# Patient Record
Sex: FEMALE | NOT HISPANIC OR LATINO | Employment: FULL TIME | URBAN - METROPOLITAN AREA
[De-identification: names, ages, dates, MRNs, and addresses within clinical notes are randomized per-mention and may not be internally consistent; named-entity substitution may affect disease eponyms.]

---

## 2017-07-19 ENCOUNTER — APPOINTMENT (OUTPATIENT)
Dept: CT IMAGING | Facility: CLINIC | Age: 42
End: 2017-07-19
Attending: STUDENT IN AN ORGANIZED HEALTH CARE EDUCATION/TRAINING PROGRAM
Payer: COMMERCIAL

## 2017-07-19 ENCOUNTER — HOSPITAL ENCOUNTER (EMERGENCY)
Facility: CLINIC | Age: 42
Discharge: HOME | End: 2017-07-19
Attending: STUDENT IN AN ORGANIZED HEALTH CARE EDUCATION/TRAINING PROGRAM | Admitting: STUDENT IN AN ORGANIZED HEALTH CARE EDUCATION/TRAINING PROGRAM
Payer: COMMERCIAL

## 2017-07-19 ENCOUNTER — APPOINTMENT (OUTPATIENT)
Dept: ULTRASOUND IMAGING | Facility: CLINIC | Age: 42
End: 2017-07-19
Attending: STUDENT IN AN ORGANIZED HEALTH CARE EDUCATION/TRAINING PROGRAM
Payer: COMMERCIAL

## 2017-07-19 VITALS
WEIGHT: 200 LBS | HEIGHT: 72 IN | OXYGEN SATURATION: 97 % | SYSTOLIC BLOOD PRESSURE: 138 MMHG | BODY MASS INDEX: 27.09 KG/M2 | HEART RATE: 82 BPM | TEMPERATURE: 98 F | RESPIRATION RATE: 16 BRPM | DIASTOLIC BLOOD PRESSURE: 94 MMHG

## 2017-07-19 DIAGNOSIS — N20.1 LEFT URETERAL CALCULUS: ICD-10-CM

## 2017-07-19 DIAGNOSIS — K76.0 FATTY LIVER: ICD-10-CM

## 2017-07-19 LAB
ALBUMIN SERPL-MCNC: 4.3 G/DL (ref 3.4–5)
ALBUMIN UR-MCNC: NEGATIVE MG/DL
ALP SERPL-CCNC: 105 U/L (ref 40–150)
ALT SERPL W P-5'-P-CCNC: 309 U/L (ref 0–50)
ANION GAP SERPL CALCULATED.3IONS-SCNC: 8 MMOL/L (ref 3–14)
APPEARANCE UR: CLEAR
AST SERPL W P-5'-P-CCNC: 256 U/L (ref 0–45)
BASOPHILS # BLD AUTO: 0 10E9/L (ref 0–0.2)
BASOPHILS NFR BLD AUTO: 0.3 %
BILIRUB SERPL-MCNC: 0.6 MG/DL (ref 0.2–1.3)
BILIRUB UR QL STRIP: NEGATIVE
BUN SERPL-MCNC: 8 MG/DL (ref 7–30)
CALCIUM SERPL-MCNC: 9.9 MG/DL (ref 8.5–10.1)
CHLORIDE SERPL-SCNC: 107 MMOL/L (ref 94–109)
CO2 SERPL-SCNC: 25 MMOL/L (ref 20–32)
COLOR UR AUTO: YELLOW
CREAT SERPL-MCNC: 0.7 MG/DL (ref 0.52–1.04)
DIFFERENTIAL METHOD BLD: ABNORMAL
EOSINOPHIL # BLD AUTO: 0.1 10E9/L (ref 0–0.7)
EOSINOPHIL NFR BLD AUTO: 0.7 %
ERYTHROCYTE [DISTWIDTH] IN BLOOD BY AUTOMATED COUNT: 12.3 % (ref 10–15)
GFR SERPL CREATININE-BSD FRML MDRD: ABNORMAL ML/MIN/1.7M2
GLUCOSE SERPL-MCNC: 124 MG/DL (ref 70–99)
GLUCOSE UR STRIP-MCNC: NEGATIVE MG/DL
HCG UR QL: NEGATIVE
HCT VFR BLD AUTO: 42.8 % (ref 35–47)
HGB BLD-MCNC: 14.8 G/DL (ref 11.7–15.7)
HGB UR QL STRIP: ABNORMAL
IMM GRANULOCYTES # BLD: 0 10E9/L (ref 0–0.4)
IMM GRANULOCYTES NFR BLD: 0.1 %
KETONES UR STRIP-MCNC: 10 MG/DL
LEUKOCYTE ESTERASE UR QL STRIP: NEGATIVE
LIPASE SERPL-CCNC: 190 U/L (ref 73–393)
LYMPHOCYTES # BLD AUTO: 0.8 10E9/L (ref 0.8–5.3)
LYMPHOCYTES NFR BLD AUTO: 8.6 %
MCH RBC QN AUTO: 34.3 PG (ref 26.5–33)
MCHC RBC AUTO-ENTMCNC: 34.6 G/DL (ref 31.5–36.5)
MCV RBC AUTO: 99 FL (ref 78–100)
MONOCYTES # BLD AUTO: 0.6 10E9/L (ref 0–1.3)
MONOCYTES NFR BLD AUTO: 5.7 %
MUCOUS THREADS #/AREA URNS LPF: PRESENT /LPF
NEUTROPHILS # BLD AUTO: 8.1 10E9/L (ref 1.6–8.3)
NEUTROPHILS NFR BLD AUTO: 84.6 %
NITRATE UR QL: NEGATIVE
PH UR STRIP: 6 PH (ref 5–7)
PLATELET # BLD AUTO: 212 10E9/L (ref 150–450)
POTASSIUM SERPL-SCNC: 3.6 MMOL/L (ref 3.4–5.3)
PROT SERPL-MCNC: 8.2 G/DL (ref 6.8–8.8)
RBC # BLD AUTO: 4.31 10E12/L (ref 3.8–5.2)
RBC #/AREA URNS AUTO: 15 /HPF (ref 0–2)
SODIUM SERPL-SCNC: 140 MMOL/L (ref 133–144)
SP GR UR STRIP: 1.01 (ref 1–1.03)
SQUAMOUS #/AREA URNS AUTO: 2 /HPF (ref 0–1)
URN SPEC COLLECT METH UR: ABNORMAL
UROBILINOGEN UR STRIP-MCNC: NORMAL MG/DL (ref 0–2)
WBC # BLD AUTO: 9.6 10E9/L (ref 4–11)
WBC #/AREA URNS AUTO: 2 /HPF (ref 0–2)

## 2017-07-19 PROCEDURE — 80053 COMPREHEN METABOLIC PANEL: CPT | Performed by: STUDENT IN AN ORGANIZED HEALTH CARE EDUCATION/TRAINING PROGRAM

## 2017-07-19 PROCEDURE — 81025 URINE PREGNANCY TEST: CPT | Performed by: STUDENT IN AN ORGANIZED HEALTH CARE EDUCATION/TRAINING PROGRAM

## 2017-07-19 PROCEDURE — 85025 COMPLETE CBC W/AUTO DIFF WBC: CPT | Performed by: STUDENT IN AN ORGANIZED HEALTH CARE EDUCATION/TRAINING PROGRAM

## 2017-07-19 PROCEDURE — 25000125 ZZHC RX 250: Performed by: RADIOLOGY

## 2017-07-19 PROCEDURE — 83690 ASSAY OF LIPASE: CPT | Performed by: STUDENT IN AN ORGANIZED HEALTH CARE EDUCATION/TRAINING PROGRAM

## 2017-07-19 PROCEDURE — 96361 HYDRATE IV INFUSION ADD-ON: CPT

## 2017-07-19 PROCEDURE — 99284 EMERGENCY DEPT VISIT MOD MDM: CPT | Mod: 25

## 2017-07-19 PROCEDURE — 96374 THER/PROPH/DIAG INJ IV PUSH: CPT | Mod: 59

## 2017-07-19 PROCEDURE — 25000128 H RX IP 250 OP 636: Performed by: STUDENT IN AN ORGANIZED HEALTH CARE EDUCATION/TRAINING PROGRAM

## 2017-07-19 PROCEDURE — 74177 CT ABD & PELVIS W/CONTRAST: CPT

## 2017-07-19 PROCEDURE — 76705 ECHO EXAM OF ABDOMEN: CPT

## 2017-07-19 PROCEDURE — 99283 EMERGENCY DEPT VISIT LOW MDM: CPT | Performed by: STUDENT IN AN ORGANIZED HEALTH CARE EDUCATION/TRAINING PROGRAM

## 2017-07-19 PROCEDURE — 25000128 H RX IP 250 OP 636: Performed by: RADIOLOGY

## 2017-07-19 PROCEDURE — 81001 URINALYSIS AUTO W/SCOPE: CPT | Performed by: STUDENT IN AN ORGANIZED HEALTH CARE EDUCATION/TRAINING PROGRAM

## 2017-07-19 RX ORDER — IOPAMIDOL 755 MG/ML
97 INJECTION, SOLUTION INTRAVASCULAR ONCE
Status: COMPLETED | OUTPATIENT
Start: 2017-07-19 | End: 2017-07-19

## 2017-07-19 RX ORDER — KETOROLAC TROMETHAMINE 15 MG/ML
15 INJECTION, SOLUTION INTRAMUSCULAR; INTRAVENOUS ONCE
Status: COMPLETED | OUTPATIENT
Start: 2017-07-19 | End: 2017-07-19

## 2017-07-19 RX ORDER — HYDROCODONE BITARTRATE AND ACETAMINOPHEN 5; 325 MG/1; MG/1
1-2 TABLET ORAL EVERY 4 HOURS PRN
Qty: 10 TABLET | Refills: 0 | Status: SHIPPED | OUTPATIENT
Start: 2017-07-19 | End: 2019-01-15

## 2017-07-19 RX ADMIN — SODIUM CHLORIDE 64 ML: 9 INJECTION, SOLUTION INTRAVENOUS at 15:52

## 2017-07-19 RX ADMIN — KETOROLAC TROMETHAMINE 15 MG: 15 INJECTION, SOLUTION INTRAMUSCULAR; INTRAVENOUS at 14:20

## 2017-07-19 RX ADMIN — SODIUM CHLORIDE 1000 ML: 9 INJECTION, SOLUTION INTRAVENOUS at 14:00

## 2017-07-19 RX ADMIN — IOPAMIDOL 97 ML: 755 INJECTION, SOLUTION INTRAVENOUS at 15:52

## 2017-07-19 NOTE — ED NOTES
"Pt here with abdominal pain and flank pain, has a family history of stones, \"this feels worse then labor pains\"  "

## 2017-07-19 NOTE — ED PROVIDER NOTES
History     Chief Complaint   Patient presents with     Flank Pain     left side  N/V     HPI  Geovanna Olvera is a 41 year old female who presents for evaluation of left-sided flank pain with nausea and vomiting. Patient claims that she was feeling well yesterday, typical diet and without gastrointestinal symptoms, however he woke this morning with achy left-sided flank pain. The pain is associated with nausea and the patient has had several episodes of nonbloody emesis. She denies chest pain, cough, midline back pain, dysuria, hematuria, or recent injury. She is currently menstruating.     I have reviewed the Medications, Allergies, Past Medical and Surgical History, and Social History in the Epic system.    Patient Active Problem List   Diagnosis     Appendicitis, acute       Past Surgical History:   Procedure Laterality Date     LAPAROSCOPIC APPENDECTOMY  8/15/2011    Procedure:LAPAROSCOPIC APPENDECTOMY; Surgeon:QIANA PERKINS; Location:[facility]       Social History     Social History     Marital status: Single     Spouse name: N/A     Number of children: N/A     Years of education: N/A     Occupational History     Not on file.     Social History Main Topics     Smoking status: Current Every Day Smoker     Packs/day: 0.50     Years: 10.00     Types: Cigarettes     Smokeless tobacco: Not on file     Alcohol use Yes     Drug use: No     Sexual activity: Not on file     Other Topics Concern     Not on file     Social History Narrative       No family history on file.      There is no immunization history on file for this patient.      Review of Systems  Constitutional: Negative for fever or chills.  Respiratory: Negative for cough or shortness of breath.  Cardiovascular: Negative for chest pain.  Gastrointestinal: Positive for left flank pain with nausea and vomiting. Negative for anterior abdominal pain, diarrhea, or blood with bowel movements.  Genitourinary: Negative for dysuria, hematuria, pelvic pain or  vaginal discharge.  Musculoskeletal: Negative for midline back pain or recent injuries.  Neurological: Negative for headache.    All others reviewed and are negative.      Physical Exam   BP: (!) 154/95  Pulse: 91  Temp: 98  F (36.7  C)  Resp: 16  Height: 182.9 cm (6')  Weight: 90.7 kg (200 lb)  SpO2: 100 %  Physical Exam  Constitutional: Well developed, well nourished. Appears nontoxic and in no acute distress.   Head: Normocephalic and atraumatic. Symmetric in appearance.  Eyes: Conjunctivae are normal.  Neck: Neck supple.  Cardiovascular: No cyanosis. RRR. No audible murmurs noted.  Respiratory: Effort normal, no respiratory distress. CTAB without diminished regions. No wheezing, rhonchi, or crackles.  Gastrointestinal: Soft, nondistended abdomen. Left flank tenderness, anterior abdomen is nontender and without guarding. No rigidity or rebound tenderness. Negative McBurney's point. Negative for Mcnally's sign.   Musculoskeletal: Moves all extremities spontaneously and without complaint.  Neuro: Patient is alert.  Skin: Skin is warm and dry, not diaphoretic.      ED Course     ED Course     Procedures            Critical Care time:  none               Results for orders placed or performed during the hospital encounter of 07/19/17 (from the past 24 hour(s))   CBC with platelets differential   Result Value Ref Range    WBC 9.6 4.0 - 11.0 10e9/L    RBC Count 4.31 3.8 - 5.2 10e12/L    Hemoglobin 14.8 11.7 - 15.7 g/dL    Hematocrit 42.8 35.0 - 47.0 %    MCV 99 78 - 100 fl    MCH 34.3 (H) 26.5 - 33.0 pg    MCHC 34.6 31.5 - 36.5 g/dL    RDW 12.3 10.0 - 15.0 %    Platelet Count 212 150 - 450 10e9/L    Diff Method Automated Method     % Neutrophils 84.6 %    % Lymphocytes 8.6 %    % Monocytes 5.7 %    % Eosinophils 0.7 %    % Basophils 0.3 %    % Immature Granulocytes 0.1 %    Absolute Neutrophil 8.1 1.6 - 8.3 10e9/L    Absolute Lymphocytes 0.8 0.8 - 5.3 10e9/L    Absolute Monocytes 0.6 0.0 - 1.3 10e9/L    Absolute  Eosinophils 0.1 0.0 - 0.7 10e9/L    Absolute Basophils 0.0 0.0 - 0.2 10e9/L    Abs Immature Granulocytes 0.0 0 - 0.4 10e9/L   Comprehensive metabolic panel   Result Value Ref Range    Sodium 140 133 - 144 mmol/L    Potassium 3.6 3.4 - 5.3 mmol/L    Chloride 107 94 - 109 mmol/L    Carbon Dioxide 25 20 - 32 mmol/L    Anion Gap 8 3 - 14 mmol/L    Glucose 124 (H) 70 - 99 mg/dL    Urea Nitrogen 8 7 - 30 mg/dL    Creatinine 0.70 0.52 - 1.04 mg/dL    GFR Estimate >90  Non  GFR Calc   >60 mL/min/1.7m2    GFR Estimate If Black >90   GFR Calc   >60 mL/min/1.7m2    Calcium 9.9 8.5 - 10.1 mg/dL    Bilirubin Total 0.6 0.2 - 1.3 mg/dL    Albumin 4.3 3.4 - 5.0 g/dL    Protein Total 8.2 6.8 - 8.8 g/dL    Alkaline Phosphatase 105 40 - 150 U/L     (H) 0 - 50 U/L     (H) 0 - 45 U/L   Lipase   Result Value Ref Range    Lipase 190 73 - 393 U/L   HCG qualitative urine   Result Value Ref Range    HCG Qual Urine Negative NEG   UA reflex to Microscopic   Result Value Ref Range    Color Urine Yellow     Appearance Urine Clear     Glucose Urine Negative NEG mg/dL    Bilirubin Urine Negative NEG    Ketones Urine 10 (A) NEG mg/dL    Specific Gravity Urine 1.007 1.003 - 1.035    Blood Urine Moderate (A) NEG    pH Urine 6.0 5.0 - 7.0 pH    Protein Albumin Urine Negative NEG mg/dL    Urobilinogen mg/dL Normal 0.0 - 2.0 mg/dL    Nitrite Urine Negative NEG    Leukocyte Esterase Urine Negative NEG    Source Midstream Urine     RBC Urine 15 (H) 0 - 2 /HPF    WBC Urine 2 0 - 2 /HPF    Squamous Epithelial /HPF Urine 2 (H) 0 - 1 /HPF    Mucous Urine Present (A) NEG /LPF   CT Abdomen Pelvis w Contrast    Narrative    CT ABDOMEN PELVIS W CONTRAST 7/19/2017 4:02 PM    HISTORY: Left flank pain.    CONTRAST:  97 mL Isovue 370.    TECHNIQUE: CT of the abdomen and pelvis is performed with IV contrast.    Routine assessed structures include the liver, spleen, pancreas,  adrenal glands, and kidneys. Other assessed  structures include the  retroperitoneum, abdominal aorta, visualized gastrointestinal tract,  and abdominal wall. Intrapelvic anatomy is also assessed.    Radiation dose for this scan is reduced using automated exposure  control, adjustment of the mA and/or kV according to patient size, or  iterative reconstruction technique.    COMPARISON: 8/15/2011.    FINDINGS:    Abdomen: There is a 2-3 mm nonobstructing calculus in the right  kidney. This was not apparent on the prior study. There is mild  left-sided hydronephrosis. There is a small amount of perirenal  inflammatory change and minimal fluid in the left perirenal space.  Fatty infiltration of the liver is present.    Pelvis:  There is a 2 mm calculus at the left ureterovesical junction.  There is a 2 cm cyst in the right ovary. There is no free pelvic  fluid.      Impression    IMPRESSION:    1. There is a 2 mm calculus at the left ureterovesical junction with  mild associated hydronephrosis.  2. There is a 2-3 mm nonobstructing calculus in the right kidney.    CAROL TRUONG MD   Abdomen US, limited (RUQ only)    Narrative    ULTRASOUND ABDOMEN LIMITED 7/19/2017 5:02 PM     HISTORY: Left flank pain, elevated hepatic enzymes.    TECHNIQUE: Right upper quadrant ultrasound.    FINDINGS: The gallbladder is normal in appearance. There is no  evidence for cholelithiasis or biliary obstruction. Common duct  measures 0.4 cm. There is some diffuse fatty infiltration of the  liver. The liver is otherwise normal in appearance. The pancreas and  right kidney are normal.      Impression    IMPRESSION:  1. No evidence for cholelithiasis or biliary obstruction.  2. Diffuse fatty infiltration of the liver.    LEBRON HECTOR MD         Assessments & Plan (with Medical Decision Making)   Geovanna Olvera is a 41 year old female who presents to the department for complaint of left-sided flank pain with nausea and vomiting. She does have moderate blood up on urinalysis but currently  menstruating, nitrate negative and unimpressive for infection but culture pending. CBC is without leukocytosis. Lipase within reference range. CT imaging has confirmed left-sided ureteral calculus at the UVJ as well as incidental 2 cm cyst of right ovary. However she has moderately elevated hepatic enzymes without right upper quadrant tenderness. Formal ultrasound was ordered, no sign of cholecystitis or choledocholithiasis but diffuse fatty liver. This could be the cause of elevated hepatic enzymes. Recommend she take OTC ibuprofen as needed for left flank pain, will also be provided a short course of hydrocodone/acetaminophen as needed for breakthrough pain. Comprehensive metabolic panel ordered as outpatient to complete completed over the next 2-3 days.    Prior to discharge, I made it clear that illness can unexpectedly develop/progress so she has been instructed to return to the emergency department for reevaluation of evolving symptoms, change in severity, fever, or other concerns. She seems comfortable with the discharge plan we discussed including follow up with urology if flank pain does not resolve. Must also follow up with primary care to reevaluate her elevated hepatic enzymes.    Disclaimer: This note consists of symbols derived from keyboarding, dictation, and/or voice recognition software. As a result, there may be errors in the script that have gone undetected.  Please consider this when interpreting information found in the chart.        I have reviewed the nursing notes.    I have reviewed the findings, diagnosis, plan and need for follow up with the patient.       New Prescriptions    HYDROCODONE-ACETAMINOPHEN (NORCO) 5-325 MG PER TABLET    Take 1-2 tablets by mouth every 4 hours as needed for moderate to severe pain       Final diagnoses:   Left ureteral calculus   Fatty liver       7/19/2017   [facility] EMERGENCY DEPARTMENT     Will Lawrence DO  07/19/17 0816

## 2017-07-19 NOTE — DISCHARGE INSTRUCTIONS
Treating Kidney Stones: Expectant Therapy  Most kidney stones are about the size of a grape seed. Stones of this size are small enough to pass naturally. Once it is passed, a stone can be analyzed. This wait-and-see approach is called expectant therapy. Small stones can often be passed with expectant therapy. If pain is a problem, ask your healthcare provider about pain medicines. Then follow his or her directions on how much water to drink. Drinking more water creates more urine to flush out your stone. Also be sure to strain your urine. Take any stones you pass to your provider for analysis.    Drink lots of water  Drinking lots of water may help your stone pass. Water also dilutes the chemicals in your urine. This reduces your risk of forming new stones. You may be told to drink 8, 12-ounce glasses of water a day. Avoid liquids that dehydrate you, such as those containing caffeine or alcohol.  Strain your urine  Straining your urine lets you collect your stone for analysis. Use the strainer each time you urinate. Strain your urine for as long as your healthcare provider suggests. Watch for brown, tan, gold, or black specks or tiny dorinda. These may be kidney stones.  Take your medicine  Your healthcare provider may give you medicine that makes it more likely for you to pass the kidney stone.   Follow up with your healthcare provider  Follow up by taking any stones you find to your provider for analysis. The type of stone you have determines your diet and prevention program. You may need more tests in the future. These tests will ensure that new stones are not forming.  Date Last Reviewed: 1/1/2017 2000-2017 [employer]. [address] [place] [address], [place] [address]. All rights reserved. This information is not intended as a substitute for professional medical care. Always follow your healthcare professional's instructions.

## 2017-07-19 NOTE — ED AVS SNAPSHOT
[facility] Emergency Department    [address] [address]    Phone:  [phone]    Fax:  [phone]                                       Geovanna Olvera   MRN: [id number]    Department:  [facility] Emergency Department   Date of Visit:  7/19/2017           After Visit Summary Signature Page     I have received my discharge instructions, and my questions have been answered. I have discussed any challenges I see with this plan with the nurse or doctor.    ..........................................................................................................................................  Patient/Patient Representative Signature      ..........................................................................................................................................  Patient Representative Print Name and Relationship to Patient    ..................................................               ................................................  Date                                            Time    ..........................................................................................................................................  Reviewed by Signature/Title    ...................................................              ..............................................  Date                                                            Time

## 2017-07-19 NOTE — ED AVS SNAPSHOT
[facility] Emergency Department    [address] [address]    Phone:  [phone]    Fax:  [phone]                                       Geovanna Olvera   MRN: [id number]    Department:  [facility] Emergency Department   Date of Visit:  7/19/2017           Patient Information     Date Of Birth          1975        Your diagnoses for this visit were:     Left ureteral calculus     Fatty liver        You were seen by Will Lawrence DO.      Follow-up Information     Follow up with [facility]. Schedule an appointment as soon as possible for a visit in 5 days.    Specialty:  Clinic    Why:  Followup for reevaluation and managment plan, discussed liver enzymes.        Discharge Instructions         Treating Kidney Stones: Expectant Therapy  Most kidney stones are about the size of a grape seed. Stones of this size are small enough to pass naturally. Once it is passed, a stone can be analyzed. This wait-and-see approach is called expectant therapy. Small stones can often be passed with expectant therapy. If pain is a problem, ask your healthcare provider about pain medicines. Then follow his or her directions on how much water to drink. Drinking more water creates more urine to flush out your stone. Also be sure to strain your urine. Take any stones you pass to your provider for analysis.    Drink lots of water  Drinking lots of water may help your stone pass. Water also dilutes the chemicals in your urine. This reduces your risk of forming new stones. You may be told to drink 8, 12-ounce glasses of water a day. Avoid liquids that dehydrate you, such as those containing caffeine or alcohol.  Strain your urine  Straining your urine lets you collect your stone for analysis. Use the strainer each time you urinate. Strain your urine for as long as your healthcare provider suggests. Watch for brown, tan, gold, or black specks or tiny dorinda. These may be kidney  stones.  Take your medicine  Your healthcare provider may give you medicine that makes it more likely for you to pass the kidney stone.   Follow up with your healthcare provider  Follow up by taking any stones you find to your provider for analysis. The type of stone you have determines your diet and prevention program. You may need more tests in the future. These tests will ensure that new stones are not forming.  Date Last Reviewed: 1/1/2017 2000-2017 [employer]. [address] [place] [address], [place] [address]. All rights reserved. This information is not intended as a substitute for professional medical care. Always follow your healthcare professional's instructions.          24 Hour Appointment Hotline       To make an appointment at any [facility], call [phone] ([phone]). If you don't have a family doctor or clinic, we will help you find one. [facility] clinics are conveniently located to serve the needs of you and your family.          ED Discharge Orders     Comprehensive metabolic panel                    Review of your medicines      START taking        Dose / Directions Last dose taken    HYDROcodone-acetaminophen 5-325 MG per tablet   Commonly known as:  NORCO   Dose:  1-2 tablet   Quantity:  10 tablet        Take 1-2 tablets by mouth every 4 hours as needed for moderate to severe pain   Refills:  0                Prescriptions were sent or printed at these locations (1 Prescription)                   [facility] [address] [place] [address] [address]    Telephone:  [phone]   Fax:  [phone]   Hours:                  Printed at Department/Unit printer (1 of 1)         HYDROcodone-acetaminophen (NORCO) 5-325 MG per tablet                Procedures and tests performed during your visit     Abdomen US, limited (RUQ only)    CBC with platelets differential    CT Abdomen Pelvis w Contrast    Comprehensive metabolic panel     HCG qualitative urine    Lipase    Peripheral IV: Standard    Pulse oximetry nursing    UA reflex to Microscopic      Orders Needing Specimen Collection     None      Pending Results     Date and Time Order Name Status Description    7/19/2017 1521 Abdomen US, limited (RUQ only) Preliminary             Pending Culture Results     No orders found from 7/17/2017 to 7/20/2017.            Pending Results Instructions     If you had any lab results that were not finalized at the time of your Discharge, you can call the ED Lab Result RN at [phone]. You will be contacted by this team for any positive Lab results or changes in treatment. The nurses are available 7 days a week from 10A to 6:30P.  You can leave a message 24 hours per day and they will return your call.        Test Results From Your Hospital Stay        7/19/2017  2:24 PM      Component Results     Component Value Ref Range & Units Status    WBC 9.6 4.0 - 11.0 10e9/L Final    RBC Count 4.31 3.8 - 5.2 10e12/L Final    Hemoglobin 14.8 11.7 - 15.7 g/dL Final    Hematocrit 42.8 35.0 - 47.0 % Final    MCV 99 78 - 100 fl Final    MCH 34.3 (H) 26.5 - 33.0 pg Final    MCHC 34.6 31.5 - 36.5 g/dL Final    RDW 12.3 10.0 - 15.0 % Final    Platelet Count 212 150 - 450 10e9/L Final    Diff Method Automated Method  Final    % Neutrophils 84.6 % Final    % Lymphocytes 8.6 % Final    % Monocytes 5.7 % Final    % Eosinophils 0.7 % Final    % Basophils 0.3 % Final    % Immature Granulocytes 0.1 % Final    Absolute Neutrophil 8.1 1.6 - 8.3 10e9/L Final    Absolute Lymphocytes 0.8 0.8 - 5.3 10e9/L Final    Absolute Monocytes 0.6 0.0 - 1.3 10e9/L Final    Absolute Eosinophils 0.1 0.0 - 0.7 10e9/L Final    Absolute Basophils 0.0 0.0 - 0.2 10e9/L Final    Abs Immature Granulocytes 0.0 0 - 0.4 10e9/L Final         7/19/2017  2:55 PM      Component Results     Component Value Ref Range & Units Status    Sodium 140 133 - 144 mmol/L Final    Potassium 3.6 3.4 - 5.3 mmol/L Final     Chloride 107 94 - 109 mmol/L Final    Carbon Dioxide 25 20 - 32 mmol/L Final    Anion Gap 8 3 - 14 mmol/L Final    Glucose 124 (H) 70 - 99 mg/dL Final    Urea Nitrogen 8 7 - 30 mg/dL Final    Creatinine 0.70 0.52 - 1.04 mg/dL Final    GFR Estimate >90  Non  GFR Calc   >60 mL/min/1.7m2 Final    GFR Estimate If Black >90   GFR Calc   >60 mL/min/1.7m2 Final    Calcium 9.9 8.5 - 10.1 mg/dL Final    Bilirubin Total 0.6 0.2 - 1.3 mg/dL Final    Albumin 4.3 3.4 - 5.0 g/dL Final    Protein Total 8.2 6.8 - 8.8 g/dL Final    Alkaline Phosphatase 105 40 - 150 U/L Final     (H) 0 - 50 U/L Final     (H) 0 - 45 U/L Final         7/19/2017  2:55 PM      Component Results     Component Value Ref Range & Units Status    Lipase 190 73 - 393 U/L Final         7/19/2017  2:36 PM      Component Results     Component Value Ref Range & Units Status    HCG Qual Urine Negative NEG Final         7/19/2017  2:35 PM      Component Results     Component Value Ref Range & Units Status    Color Urine Yellow  Final    Appearance Urine Clear  Final    Glucose Urine Negative NEG mg/dL Final    Bilirubin Urine Negative NEG Final    Ketones Urine 10 (A) NEG mg/dL Final    Specific Gravity Urine 1.007 1.003 - 1.035 Final    Blood Urine Moderate (A) NEG Final    pH Urine 6.0 5.0 - 7.0 pH Final    Protein Albumin Urine Negative NEG mg/dL Final    Urobilinogen mg/dL Normal 0.0 - 2.0 mg/dL Final    Nitrite Urine Negative NEG Final    Leukocyte Esterase Urine Negative NEG Final    Source Midstream Urine  Final    RBC Urine 15 (H) 0 - 2 /HPF Final    WBC Urine 2 0 - 2 /HPF Final    Squamous Epithelial /HPF Urine 2 (H) 0 - 1 /HPF Final    Mucous Urine Present (A) NEG /LPF Final         7/19/2017  4:14 PM      Narrative     CT ABDOMEN PELVIS W CONTRAST 7/19/2017 4:02 PM    HISTORY: Left flank pain.    CONTRAST:  97 mL Isovue 370.    TECHNIQUE: CT of the abdomen and pelvis is performed with IV contrast.    Routine  assessed structures include the liver, spleen, pancreas,  adrenal glands, and kidneys. Other assessed structures include the  retroperitoneum, abdominal aorta, visualized gastrointestinal tract,  and abdominal wall. Intrapelvic anatomy is also assessed.    Radiation dose for this scan is reduced using automated exposure  control, adjustment of the mA and/or kV according to patient size, or  iterative reconstruction technique.    COMPARISON: 8/15/2011.    FINDINGS:    Abdomen: There is a 2-3 mm nonobstructing calculus in the right  kidney. This was not apparent on the prior study. There is mild  left-sided hydronephrosis. There is a small amount of perirenal  inflammatory change and minimal fluid in the left perirenal space.  Fatty infiltration of the liver is present.    Pelvis:  There is a 2 mm calculus at the left ureterovesical junction.  There is a 2 cm cyst in the right ovary. There is no free pelvic  fluid.        Impression     IMPRESSION:    1. There is a 2 mm calculus at the left ureterovesical junction with  mild associated hydronephrosis.  2. There is a 2-3 mm nonobstructing calculus in the right kidney.    CAROL TRUONG MD         7/19/2017  5:06 PM      Narrative     ULTRASOUND ABDOMEN LIMITED 7/19/2017 5:02 PM     HISTORY: Left flank pain, elevated hepatic enzymes.    TECHNIQUE: Right upper quadrant ultrasound.    FINDINGS: The gallbladder is normal in appearance. There is no  evidence for cholelithiasis or biliary obstruction. Common duct  measures 0.4 cm. There is some diffuse fatty infiltration of the  liver. The liver is otherwise normal in appearance. The pancreas and  right kidney are normal.        Impression     IMPRESSION:  1. No evidence for cholelithiasis or biliary obstruction.  2. Diffuse fatty infiltration of the liver.                Thank you for choosing [facility]       Thank you for choosing [facility] for your care. Our goal is always to provide you with excellent care. Hearing back from  "our patients is one way we can continue to improve our services. Please take a few minutes to complete the written survey that you may receive in the mail after you visit with us. Thank you!        [employer] Information     [employer] lets you send messages to your doctor, view your test results, renew your prescriptions, schedule appointments and more. To sign up, go to [email or web address] . Click on \"Log in\" on the left side of the screen, which will take you to the Welcome page. Then click on \"Sign up Now\" on the right side of the page.     You will be asked to enter the access code listed below, as well as some personal information. Please follow the directions to create your username and password.     Your access code is: [email or web address]  Expires: 10/17/2017  5:12 PM     Your access code will  in 90 days. If you need help or a new code, please call your [facility] clinic or [phone].        Care EveryWhere ID     This is your Care EveryWhere ID. This could be used by other organizations to access your [facility] medical records  [id number]        Equal Access to Services     ATA REYNOLDS : Hadshubham Treviño, pati thapa, sadiq burnette, roland ga . So [facility] [phone].    ATENCIÓN: Si habla español, tiene a negro disposición servicios gratuitos de asistencia lingüística. Oleg al [phone].    We comply with applicable federal civil rights laws and [place] laws. We do not discriminate on the basis of race, color, national origin, age, disability sex, sexual orientation or gender identity.            After Visit Summary       This is your record. Keep this with you and show to your community pharmacist(s) and doctor(s) at your next visit.                  "

## 2019-01-15 ENCOUNTER — OFFICE VISIT (OUTPATIENT)
Dept: FAMILY MEDICINE | Facility: CLINIC | Age: 44
End: 2019-01-15
Payer: COMMERCIAL

## 2019-01-15 VITALS
WEIGHT: 219.8 LBS | HEART RATE: 110 BPM | OXYGEN SATURATION: 97 % | HEIGHT: 72 IN | TEMPERATURE: 98.1 F | RESPIRATION RATE: 24 BRPM | DIASTOLIC BLOOD PRESSURE: 86 MMHG | SYSTOLIC BLOOD PRESSURE: 138 MMHG | BODY MASS INDEX: 29.77 KG/M2

## 2019-01-15 DIAGNOSIS — J00 ACUTE RHINITIS: Primary | ICD-10-CM

## 2019-01-15 DIAGNOSIS — J02.9 SORE THROAT: ICD-10-CM

## 2019-01-15 LAB
DEPRECATED S PYO AG THROAT QL EIA: NORMAL
SPECIMEN SOURCE: NORMAL

## 2019-01-15 PROCEDURE — 87880 STREP A ASSAY W/OPTIC: CPT | Performed by: NURSE PRACTITIONER

## 2019-01-15 PROCEDURE — 87081 CULTURE SCREEN ONLY: CPT | Performed by: NURSE PRACTITIONER

## 2019-01-15 PROCEDURE — 99203 OFFICE O/P NEW LOW 30 MIN: CPT | Performed by: NURSE PRACTITIONER

## 2019-01-15 RX ORDER — FLUTICASONE PROPIONATE 50 MCG
2 SPRAY, SUSPENSION (ML) NASAL DAILY
Qty: 1 BOTTLE | Refills: 3 | Status: SHIPPED | OUTPATIENT
Start: 2019-01-15 | End: 2022-05-16

## 2019-01-15 SDOH — HEALTH STABILITY: MENTAL HEALTH: HOW MANY STANDARD DRINKS CONTAINING ALCOHOL DO YOU HAVE ON A TYPICAL DAY?: 1 OR 2

## 2019-01-15 SDOH — HEALTH STABILITY: MENTAL HEALTH: HOW OFTEN DO YOU HAVE 6 OR MORE DRINKS ON ONE OCCASION?: NEVER

## 2019-01-15 SDOH — HEALTH STABILITY: MENTAL HEALTH: HOW OFTEN DO YOU HAVE A DRINK CONTAINING ALCOHOL?: 2-4 TIMES A MONTH

## 2019-01-15 ASSESSMENT — PAIN SCALES - GENERAL: PAINLEVEL: MODERATE PAIN (5)

## 2019-01-15 ASSESSMENT — MIFFLIN-ST. JEOR: SCORE: 1770.04

## 2019-01-15 NOTE — PROGRESS NOTES
SUBJECTIVE:   Geovanna Olvera is a 43 year old female who presents to clinic today for the following health issues:      ENT Symptoms             Symptoms: cc Present Absent Comment   Fever/Chills   x    Fatigue  x     Muscle Aches   x    Eye Irritation   x    Sneezing   x    Nasal Parish/Drg  x     Sinus Pressure/Pain   x    Loss of smell   x    Dental pain   x    Sore Throat  x  Last couple of days   Swollen Glands   x    Ear Pain/Fullness   x    Cough  x     Wheeze   x    Chest Pain   x    Shortness of breath   x    Rash       Other  x  layngitis     Symptom duration:  1 week   Symptom severity:  5/10   Treatments tried:  OTC cold -is some help    Contacts:  none known, works at gas [employer]         Problem list and histories reviewed & adjusted, as indicated.  Additional history: as documented    Patient Active Problem List   Diagnosis     Appendicitis, acute     Past Surgical History:   Procedure Laterality Date     LAPAROSCOPIC APPENDECTOMY  8/15/2011    Procedure:LAPAROSCOPIC APPENDECTOMY; Surgeon:QIANA PERKINS; Location:[facility] OR       Social History     Tobacco Use     Smoking status: Current Every Day Smoker     Packs/day: 0.50     Years: 10.00     Pack years: 5.00     Types: Cigarettes     Smokeless tobacco: Never Used   Substance Use Topics     Alcohol use: Yes     Frequency: 2-4 times a month     Drinks per session: 1 or 2     Binge frequency: Never     History reviewed. No pertinent family history.      Current Outpatient Medications   Medication Sig Dispense Refill     fluticasone (FLONASE) 50 MCG/ACT nasal spray Spray 2 sprays into both nostrils daily 1 Bottle 3     No Known Allergies    Reviewed and updated as needed this visit by clinical staff  Tobacco  Allergies  Meds  Problems  Med Hx  Surg Hx  Fam Hx  Soc Hx        Reviewed and updated as needed this visit by Provider  Tobacco  Allergies  Meds  Problems  Med Hx  Surg Hx  Fam Hx  Soc Hx          ROS:  Constitutional, HEENT,  "cardiovascular, pulmonary, gi and gu systems are negative, except as otherwise noted.    OBJECTIVE:     /86   Pulse 110   Temp 98.1  F (36.7  C)   Resp 24   Ht 1.822 m (5' 11.75\")   Wt 99.7 kg (219 lb 12.8 oz)   SpO2 97%   BMI 30.02 kg/m    Body mass index is 30.02 kg/m .  GENERAL APPEARANCE: healthy, alert and no distress  EYES: Eyes grossly normal to inspection, PERRL and conjunctivae and sclerae normal  HENT: ear canals and TM's normal, nose and mouth without ulcers or lesions and nasal mucosa edematous without rhinorrhea  NECK: no adenopathy, no asymmetry, masses, or scars and thyroid normal to palpation  RESP: lungs clear to auscultation - no rales, rhonchi or wheezes  CV: regular rates and rhythm, normal S1 S2, no S3 or S4 and no murmur, click or rub  ABDOMEN: soft, nontender, without hepatosplenomegaly or masses and bowel sounds normal  MS: extremities normal- no gross deformities noted  SKIN: no suspicious lesions or rashes  NEURO: Normal strength and tone, mentation intact and speech normal    Diagnostic Test Results:  Results for orders placed or performed in visit on 01/15/19 (from the past 24 hour(s))   Strep, Rapid Screen   Result Value Ref Range    Specimen Description Throat     Rapid Strep A Screen       NEGATIVE: No Group A streptococcal antigen detected by immunoassay, await culture report.       ASSESSMENT/PLAN:     1. Acute rhinitis  Patient has had congestion and drainage symptoms times 1 week, has remained afebrile and reports postnasal drip.  Symptoms likely viral, rapid strep negative as below.  Will treat with supportive cares and start Flonase nasal spray as well as Sudafed.  Patient advised when to return to clinic.  - fluticasone (FLONASE) 50 MCG/ACT nasal spray; Spray 2 sprays into both nostrils daily  Dispense: 1 Bottle; Refill: 3  - Beta strep group A culture    2. Sore throat  Rapid strep negative, will await culture and update patient as necessary.  Sore throat likely " secondary to postnasal drip.  - Strep, Rapid Screen  - Beta strep group A culture    Patient Instructions   Rapid strep negative - await culture     This is likely viral and need to initiate supportive cares including:    Start Flonase nasal spray daily  - prescription sent through to pharmacy    Over the pharmacy counter sudafed 12 or 24 hour to help congestion     Saline nasal flush such as netti pot may be beneficial as well     Elevate head of bed at night and use cool mist vaporizer     Return to clinic if symptoms not improving in the several days       Your clinic record indicates that you are due for:   Pap and physical exam  Please schedule          MARCOS Zepeda [facility]

## 2019-01-15 NOTE — NURSING NOTE
Chief Complaint   Patient presents with     URI       Initial There were no vitals taken for this visit. Estimated body mass index is 27.12 kg/m  as calculated from the following:    Height as of 7/19/17: 1.829 m (6').    Weight as of 7/19/17: 90.7 kg (200 lb).    Patient presents to the clinic using No DME    Health Maintenance that is potentially due pending provider review:  Pap Smear    Pt will schedule PAP appt.    Is there anyone who you would like to be able to receive your results? NOT ASKED  If yes have patient fill out ROMA

## 2019-01-15 NOTE — PATIENT INSTRUCTIONS
Rapid strep negative - await culture     This is likely viral and need to initiate supportive cares including:    Start Flonase nasal spray daily  - prescription sent through to pharmacy    Over the pharmacy counter sudafed 12 or 24 hour to help congestion     Saline nasal flush such as netti pot may be beneficial as well     Elevate head of bed at night and use cool mist vaporizer     Return to clinic if symptoms not improving in the several days       Your clinic record indicates that you are due for:   Pap and physical exam  Please schedule

## 2019-01-16 LAB
BACTERIA SPEC CULT: NORMAL
SPECIMEN SOURCE: NORMAL

## 2019-07-09 ENCOUNTER — TELEPHONE (OUTPATIENT)
Dept: FAMILY MEDICINE | Facility: CLINIC | Age: 44
End: 2019-07-09

## 2019-07-09 NOTE — TELEPHONE ENCOUNTER
Panel Management Review      Patient has the following on her problem list: None      Composite cancer screening  Chart review shows that this patient is due/due soon for the following Pap Smear  Summary:    Patient is due/failing the following:   PAP and PHYSICAL    Action needed:   Patient needs office visit for physical and pap.    Type of outreach:    Phone, left message for patient to call back.     Questions for provider review:    None                                                                                                                                    Nori Ivan MA        .

## 2019-11-27 ENCOUNTER — TELEPHONE (OUTPATIENT)
Dept: FAMILY MEDICINE | Facility: CLINIC | Age: 44
End: 2019-11-27

## 2019-11-27 NOTE — TELEPHONE ENCOUNTER
Panel Management Review      Patient has the following on her problem list: None      Composite cancer screening  Chart review shows that this patient is due/due soon for the following Pap Smear  Summary:    Patient is due/failing the following:   PAP and PHYSICAL    Action needed:   Patient needs office visit for physical and pap.    Type of outreach:    Sent letter.    Questions for provider review:    None                                                                                                                                    Nori Ivan MA

## 2019-11-27 NOTE — LETTER
[facility]  [address] [address]  [phone]        November 27, 2019  Geovanna Olvera  [address] [address]    Dear Geovanna,    I care about your health and have reviewed your health plan. I have reviewed your medical conditions, medication list, and lab results and am making recommendations based on this review, to better manage your health.    You are in particular need of attention regarding:  -Cervical Cancer Screening  -Wellness (Physical) Visit     I am recommending that you:  -schedule a PAP SMEAR EXAM which is due.  Please disregard this reminder if you have had this exam elsewhere within the last year.  It would be helpful for us to have a copy of your recent pap smear report in our file so that we can best coordinate your care.    Here is a list of Health Maintenance topics that are due now or due soon:  Health Maintenance Due   Topic Date Due     PREVENTIVE CARE VISIT  1975     DTAP/TDAP/TD IMMUNIZATION (1 - Tdap) 10/26/1988     HIV SCREENING  10/26/1992     PNEUMOCOCCAL IMMUNIZATION 19-64 MEDIUM RISK (1 of 1 - PPSV23) 10/26/1996     HPV  10/26/1998     PAP  10/26/2002     INFLUENZA VACCINE (1) 09/01/2019       Please call us at [phone] (or use [employer]) to address the above recommendations.     Thank you for trusting [facility] and we appreciate the opportunity to serve you.  We look forward to supporting your healthcare needs in the future.    Healthy Regards,    Kerri Foster, CNP/cb

## 2021-05-28 ENCOUNTER — RECORDS - HEALTHEAST (OUTPATIENT)
Dept: ADMINISTRATIVE | Facility: CLINIC | Age: 46
End: 2021-05-28

## 2021-05-29 ENCOUNTER — RECORDS - HEALTHEAST (OUTPATIENT)
Dept: ADMINISTRATIVE | Facility: CLINIC | Age: 46
End: 2021-05-29

## 2022-05-15 ENCOUNTER — HOSPITAL ENCOUNTER (EMERGENCY)
Facility: CLINIC | Age: 47
Discharge: ANOTHER HOSPITAL | End: 2022-05-16
Attending: EMERGENCY MEDICINE | Admitting: EMERGENCY MEDICINE
Payer: COMMERCIAL

## 2022-05-15 ENCOUNTER — APPOINTMENT (OUTPATIENT)
Dept: CT IMAGING | Facility: CLINIC | Age: 47
End: 2022-05-15
Attending: EMERGENCY MEDICINE
Payer: COMMERCIAL

## 2022-05-15 DIAGNOSIS — F10.930 ALCOHOL WITHDRAWAL SYNDROME WITHOUT COMPLICATION (H): ICD-10-CM

## 2022-05-15 DIAGNOSIS — K92.0 HEMATEMESIS WITH NAUSEA: ICD-10-CM

## 2022-05-15 DIAGNOSIS — K70.30 ALCOHOLIC CIRRHOSIS OF LIVER WITHOUT ASCITES (H): ICD-10-CM

## 2022-05-15 LAB
ABO/RH(D): ABNORMAL
ALBUMIN SERPL-MCNC: 3.3 G/DL (ref 3.4–5)
ALP SERPL-CCNC: 198 U/L (ref 40–150)
ALT SERPL W P-5'-P-CCNC: 36 U/L (ref 0–50)
ANION GAP SERPL CALCULATED.3IONS-SCNC: 12 MMOL/L (ref 3–14)
ANTIBODY SCREEN: POSITIVE
APTT PPP: 36 SECONDS (ref 22–38)
AST SERPL W P-5'-P-CCNC: 117 U/L (ref 0–45)
BASO+EOS+MONOS # BLD AUTO: 0.7 10E3/UL (ref 0–2.2)
BASOPHILS # BLD AUTO: 0.1 10E3/UL (ref 0–0.2)
BASOPHILS NFR BLD AUTO: 1 %
BILIRUB SERPL-MCNC: 3.2 MG/DL (ref 0.2–1.3)
BUN SERPL-MCNC: 16 MG/DL (ref 7–30)
CALCIUM SERPL-MCNC: 8.9 MG/DL (ref 8.5–10.1)
CHLORIDE BLD-SCNC: 106 MMOL/L (ref 94–109)
CO2 SERPL-SCNC: 22 MMOL/L (ref 20–32)
CREAT SERPL-MCNC: 0.6 MG/DL (ref 0.52–1.04)
EOSINOPHIL # BLD AUTO: 0 10E3/UL (ref 0–0.7)
EOSINOPHIL NFR BLD AUTO: 0 %
ERYTHROCYTE [DISTWIDTH] IN BLOOD BY AUTOMATED COUNT: 13.6 % (ref 10–15)
ETHANOL SERPL-MCNC: 0.09 G/DL
GASTROCULT GAST QL: POSITIVE
GFR SERPL CREATININE-BSD FRML MDRD: >90 ML/MIN/1.73M2
GLUCOSE BLD-MCNC: 123 MG/DL (ref 70–99)
HCT VFR BLD AUTO: 30.9 % (ref 35–47)
HEMOCCULT STL QL: POSITIVE
HGB BLD-MCNC: 10.5 G/DL (ref 11.7–15.7)
HOLD SPECIMEN: NORMAL
IMM GRANULOCYTES # BLD: 0.1 10E3/UL
IMM GRANULOCYTES NFR BLD: 1 %
INR PPP: 1.48 (ref 0.85–1.15)
LIPASE SERPL-CCNC: 110 U/L (ref 73–393)
LYMPHOCYTES # BLD AUTO: 1.2 10E3/UL (ref 0.8–5.3)
LYMPHOCYTES NFR BLD AUTO: 8 %
MCH RBC QN AUTO: 35.1 PG (ref 26.5–33)
MCHC RBC AUTO-ENTMCNC: 34 G/DL (ref 31.5–36.5)
MCV RBC AUTO: 103 FL (ref 78–100)
MONOCYTES # BLD AUTO: 0.6 10E3/UL (ref 0–1.3)
MONOCYTES NFR BLD AUTO: 5 %
NEUTROPHILS # BLD AUTO: 11.3 10E3/UL (ref 1.6–8.3)
NEUTROPHILS NFR BLD AUTO: 86 %
NRBC # BLD AUTO: 0 10E3/UL
NRBC BLD AUTO-RTO: 0 /100
PH GAST: ABNORMAL [PH]
PLAT MORPH BLD: NORMAL
PLATELET # BLD AUTO: 140 10E3/UL (ref 150–450)
POTASSIUM BLD-SCNC: 3.5 MMOL/L (ref 3.4–5.3)
PROT SERPL-MCNC: 8.3 G/DL (ref 6.8–8.8)
RBC # BLD AUTO: 2.99 10E6/UL (ref 3.8–5.2)
RBC MORPH BLD: NORMAL
SODIUM SERPL-SCNC: 140 MMOL/L (ref 133–144)
SPECIMEN EXPIRATION DATE: ABNORMAL
WBC # BLD AUTO: 13.4 10E3/UL (ref 4–11)

## 2022-05-15 PROCEDURE — 250N000009 HC RX 250: Performed by: EMERGENCY MEDICINE

## 2022-05-15 PROCEDURE — 96366 THER/PROPH/DIAG IV INF ADDON: CPT

## 2022-05-15 PROCEDURE — 80053 COMPREHEN METABOLIC PANEL: CPT | Performed by: EMERGENCY MEDICINE

## 2022-05-15 PROCEDURE — 85004 AUTOMATED DIFF WBC COUNT: CPT | Performed by: EMERGENCY MEDICINE

## 2022-05-15 PROCEDURE — 83690 ASSAY OF LIPASE: CPT | Performed by: EMERGENCY MEDICINE

## 2022-05-15 PROCEDURE — 96376 TX/PRO/DX INJ SAME DRUG ADON: CPT | Performed by: EMERGENCY MEDICINE

## 2022-05-15 PROCEDURE — 96374 THER/PROPH/DIAG INJ IV PUSH: CPT | Mod: 59 | Performed by: EMERGENCY MEDICINE

## 2022-05-15 PROCEDURE — 36415 COLL VENOUS BLD VENIPUNCTURE: CPT | Performed by: EMERGENCY MEDICINE

## 2022-05-15 PROCEDURE — 250N000011 HC RX IP 250 OP 636: Performed by: EMERGENCY MEDICINE

## 2022-05-15 PROCEDURE — 36430 TRANSFUSION BLD/BLD COMPNT: CPT | Performed by: EMERGENCY MEDICINE

## 2022-05-15 PROCEDURE — 84100 ASSAY OF PHOSPHORUS: CPT | Performed by: EMERGENCY MEDICINE

## 2022-05-15 PROCEDURE — 86850 RBC ANTIBODY SCREEN: CPT | Performed by: EMERGENCY MEDICINE

## 2022-05-15 PROCEDURE — 82271 OCCULT BLOOD OTHER SOURCES: CPT | Performed by: EMERGENCY MEDICINE

## 2022-05-15 PROCEDURE — 258N000003 HC RX IP 258 OP 636: Performed by: EMERGENCY MEDICINE

## 2022-05-15 PROCEDURE — 93005 ELECTROCARDIOGRAM TRACING: CPT | Performed by: EMERGENCY MEDICINE

## 2022-05-15 PROCEDURE — 96361 HYDRATE IV INFUSION ADD-ON: CPT | Performed by: EMERGENCY MEDICINE

## 2022-05-15 PROCEDURE — 93010 ELECTROCARDIOGRAM REPORT: CPT | Performed by: EMERGENCY MEDICINE

## 2022-05-15 PROCEDURE — 85730 THROMBOPLASTIN TIME PARTIAL: CPT | Performed by: EMERGENCY MEDICINE

## 2022-05-15 PROCEDURE — 96365 THER/PROPH/DIAG IV INF INIT: CPT | Mod: 59

## 2022-05-15 PROCEDURE — 84075 ASSAY ALKALINE PHOSPHATASE: CPT | Performed by: EMERGENCY MEDICINE

## 2022-05-15 PROCEDURE — 96375 TX/PRO/DX INJ NEW DRUG ADDON: CPT | Performed by: EMERGENCY MEDICINE

## 2022-05-15 PROCEDURE — 82077 ASSAY SPEC XCP UR&BREATH IA: CPT | Performed by: EMERGENCY MEDICINE

## 2022-05-15 PROCEDURE — 250N000011 HC RX IP 250 OP 636

## 2022-05-15 PROCEDURE — 85610 PROTHROMBIN TIME: CPT | Performed by: EMERGENCY MEDICINE

## 2022-05-15 PROCEDURE — 74177 CT ABD & PELVIS W/CONTRAST: CPT

## 2022-05-15 PROCEDURE — 99291 CRITICAL CARE FIRST HOUR: CPT | Mod: 25 | Performed by: EMERGENCY MEDICINE

## 2022-05-15 PROCEDURE — C9113 INJ PANTOPRAZOLE SODIUM, VIA: HCPCS | Performed by: EMERGENCY MEDICINE

## 2022-05-15 PROCEDURE — C9803 HOPD COVID-19 SPEC COLLECT: HCPCS | Performed by: EMERGENCY MEDICINE

## 2022-05-15 PROCEDURE — 86901 BLOOD TYPING SEROLOGIC RH(D): CPT | Performed by: EMERGENCY MEDICINE

## 2022-05-15 PROCEDURE — 99285 EMERGENCY DEPT VISIT HI MDM: CPT | Mod: 25 | Performed by: EMERGENCY MEDICINE

## 2022-05-15 PROCEDURE — 82272 OCCULT BLD FECES 1-3 TESTS: CPT | Performed by: EMERGENCY MEDICINE

## 2022-05-15 PROCEDURE — 83735 ASSAY OF MAGNESIUM: CPT | Performed by: EMERGENCY MEDICINE

## 2022-05-15 RX ORDER — IOPAMIDOL 755 MG/ML
74 INJECTION, SOLUTION INTRAVASCULAR ONCE
Status: COMPLETED | OUTPATIENT
Start: 2022-05-15 | End: 2022-05-15

## 2022-05-15 RX ORDER — ONDANSETRON 2 MG/ML
4 INJECTION INTRAMUSCULAR; INTRAVENOUS EVERY 30 MIN PRN
Status: COMPLETED | OUTPATIENT
Start: 2022-05-15 | End: 2022-05-16

## 2022-05-15 RX ORDER — LORAZEPAM 2 MG/ML
0.5 INJECTION INTRAMUSCULAR ONCE
Status: COMPLETED | OUTPATIENT
Start: 2022-05-15 | End: 2022-05-15

## 2022-05-15 RX ORDER — ONDANSETRON 2 MG/ML
INJECTION INTRAMUSCULAR; INTRAVENOUS
Status: COMPLETED
Start: 2022-05-15 | End: 2022-05-15

## 2022-05-15 RX ORDER — ONDANSETRON 2 MG/ML
4 INJECTION INTRAMUSCULAR; INTRAVENOUS ONCE
Status: COMPLETED | OUTPATIENT
Start: 2022-05-15 | End: 2022-05-15

## 2022-05-15 RX ADMIN — ONDANSETRON 4 MG: 2 INJECTION INTRAMUSCULAR; INTRAVENOUS at 17:24

## 2022-05-15 RX ADMIN — PANTOPRAZOLE SODIUM 40 MG: 40 INJECTION, POWDER, FOR SOLUTION INTRAVENOUS at 22:21

## 2022-05-15 RX ADMIN — FOLIC ACID: 5 INJECTION, SOLUTION INTRAMUSCULAR; INTRAVENOUS; SUBCUTANEOUS at 19:32

## 2022-05-15 RX ADMIN — LORAZEPAM 0.5 MG: 2 INJECTION INTRAMUSCULAR; INTRAVENOUS at 23:47

## 2022-05-15 RX ADMIN — ONDANSETRON 4 MG: 2 INJECTION INTRAMUSCULAR; INTRAVENOUS at 19:32

## 2022-05-15 RX ADMIN — SODIUM CHLORIDE 1000 ML: 9 INJECTION, SOLUTION INTRAVENOUS at 17:24

## 2022-05-15 RX ADMIN — IOPAMIDOL 74 ML: 755 INJECTION, SOLUTION INTRAVENOUS at 22:34

## 2022-05-15 RX ADMIN — SODIUM CHLORIDE 500 ML: 9 INJECTION, SOLUTION INTRAVENOUS at 23:47

## 2022-05-15 RX ADMIN — SODIUM CHLORIDE 500 ML: 9 INJECTION, SOLUTION INTRAVENOUS at 22:20

## 2022-05-15 RX ADMIN — SODIUM CHLORIDE 58 ML: 9 INJECTION, SOLUTION INTRAVENOUS at 22:33

## 2022-05-15 NOTE — ED TRIAGE NOTES
Pt vomited x1 last night and x2 today.  Today had blood in vomit.  No abd pain.  Nauseated.  No fevers.  Pt admits to 2 vodka drinks a day.  Daughter sick as well.     Triage Assessment     Row Name 05/15/22 6397       Triage Assessment (Adult)    Airway WDL WDL       Respiratory WDL    Respiratory WDL WDL       Skin Circulation/Temperature WDL    Skin Circulation/Temperature WDL WDL       Cardiac WDL    Cardiac WDL WDL       Peripheral/Neurovascular WDL    Peripheral Neurovascular WDL WDL       Cognitive/Neuro/Behavioral WDL    Cognitive/Neuro/Behavioral WDL WDL

## 2022-05-15 NOTE — ED PROVIDER NOTES
History     Chief Complaint   Patient presents with     Hematemesis     HPI  Geovanna Olvera is a 46 year old female with past medical history significant for laparoscopic appendectomy and daily alcohol use who presents emergency department complaining of hematemesis.  Patient states she vomited last night and needed blood in it today she has vomited twice and there is also been blood in it.  She has been nauseated denies fevers patient just is not feeling well he is not having significant abdominal pain.  States she drinks 2 vodka drinks a day but daughter did pull me aside after talking with the patient and said she drinks much more than this.  Patient is not any chest pain or shortness of breath.  Denies any significantly darkened stools although she has not been checking these out.  She denies any headache or visual changes.  She has not had any focal numbness weakness in any extremity.  Denies any calf pain or rash.    Allergies:  No Known Allergies    Problem List:    Patient Active Problem List    Diagnosis Date Noted     Appendicitis, acute 08/23/2011     Priority: Medium        Past Medical History:    No past medical history on file.    Past Surgical History:    Past Surgical History:   Procedure Laterality Date     LAPAROSCOPIC APPENDECTOMY  8/15/2011    Procedure:LAPAROSCOPIC APPENDECTOMY; Surgeon:QIANA PERKINS; Location:[facility] OR       Family History:    No family history on file.    Social History:  Marital Status:  Single [1]  Social History     Tobacco Use     Smoking status: Current Every Day Smoker     Packs/day: 0.50     Years: 10.00     Pack years: 5.00     Types: Cigarettes     Smokeless tobacco: Never Used   Substance Use Topics     Alcohol use: Yes     Drug use: No        Medications:    fluticasone (FLONASE) 50 MCG/ACT nasal spray          Review of Systems  All systems reviewed and other than pertinent positives and negatives in HPI all other systems are negative.  Physical Exam   BP: (!)  140/92  Pulse: (!) 124  Temp: 98.8  F (37.1  C)  Resp: 14  Height: 182.9 cm (6')  Weight: 68 kg (150 lb)  SpO2: 97 %      Physical Exam  Vitals and nursing note reviewed.   Constitutional:       Appearance: Normal appearance. She is not ill-appearing, toxic-appearing or diaphoretic.      Comments: Mild generalized distress   HENT:      Head: Normocephalic and atraumatic.      Right Ear: Tympanic membrane normal.      Left Ear: Tympanic membrane normal.      Nose: Nose normal.      Mouth/Throat:      Mouth: Mucous membranes are moist.      Pharynx: Oropharynx is clear.   Eyes:      Conjunctiva/sclera: Conjunctivae normal.   Cardiovascular:      Rate and Rhythm: Regular rhythm. Tachycardia present.      Pulses: Normal pulses.      Heart sounds: Normal heart sounds. No murmur heard.  Pulmonary:      Effort: Pulmonary effort is normal.      Breath sounds: Normal breath sounds. No wheezing or rhonchi.   Abdominal:      General: Abdomen is flat. Bowel sounds are normal.      Palpations: Abdomen is soft.      Tenderness: There is no left CVA tenderness.      Comments: Liver enlarged there is mild epigastric tenderness to palpation no guarding or rebound is present no lower abdominal tenderness is noted.   Musculoskeletal:         General: No swelling or tenderness. Normal range of motion.      Cervical back: Normal range of motion and neck supple.      Right lower leg: No edema.      Left lower leg: No edema.   Skin:     General: Skin is warm and dry.      Capillary Refill: Capillary refill takes less than 2 seconds.      Findings: No bruising, lesion or rash.   Neurological:      General: No focal deficit present.      Mental Status: She is alert and oriented to person, place, and time.      Motor: No weakness.      Coordination: Coordination normal.   Psychiatric:         Mood and Affect: Mood normal.         ED Course                 Procedures              EKG Interpretation:      Interpreted by Santino Phelan,  MD  Rhythm: sinus tachycardia  Rate: 110-120  Axis: Normal  Ectopy: none  Conduction: normal  ST Segments/ T Waves: Non-specific ST-T wave changes  Q Waves: none  Comparison to prior: No old EKG available    Clinical Impression: Sinus tachycardia with nonspecific ST-T wave abnormalities.    Critical Care time:  Was 45  minutes for this patient excluding procedures.  For management of GI bleed and alcohol withdrawal.               Results for orders placed or performed during the hospital encounter of 05/15/22 (from the past 24 hour(s))   [place] Draw    Narrative    The following orders were created for panel order [place] Draw.  Procedure                               Abnormality         Status                     ---------                               -----------         ------                     Extra Blue Top Tube[[id number]]                              Final result               Extra Red Top Tube[[id number]]                               Final result               Extra Green Top (Lithium...[[id number]]                      Final result               Extra Purple Top Tube[[id number]]                            Final result                 Please view results for these tests on the individual orders.   Extra Blue Top Tube   Result Value Ref Range    Hold Specimen JIC    Extra Red Top Tube   Result Value Ref Range    Hold Specimen JIC    Extra Green Top (Lithium Heparin) Tube   Result Value Ref Range    Hold Specimen JIC    Extra Purple Top Tube   Result Value Ref Range    Hold Specimen JIC    CBC with platelets differential *Canceled*    Narrative    The following orders were created for panel order CBC with platelets differential.  Procedure                               Abnormality         Status                     ---------                               -----------         ------                     CBC with platelets and d...[[id number]]                                                   Please view results  for these tests on the individual orders.   Comprehensive metabolic panel   Result Value Ref Range    Sodium 140 133 - 144 mmol/L    Potassium 3.5 3.4 - 5.3 mmol/L    Chloride 106 94 - 109 mmol/L    Carbon Dioxide (CO2) 22 20 - 32 mmol/L    Anion Gap 12 3 - 14 mmol/L    Urea Nitrogen 16 7 - 30 mg/dL    Creatinine 0.60 0.52 - 1.04 mg/dL    Calcium 8.9 8.5 - 10.1 mg/dL    Glucose 123 (H) 70 - 99 mg/dL    Alkaline Phosphatase 198 (H) 40 - 150 U/L     (H) 0 - 45 U/L    ALT 36 0 - 50 U/L    Protein Total 8.3 6.8 - 8.8 g/dL    Albumin 3.3 (L) 3.4 - 5.0 g/dL    Bilirubin Total 3.2 (H) 0.2 - 1.3 mg/dL    GFR Estimate >90 >60 mL/min/1.73m2   Lipase   Result Value Ref Range    Lipase 110 73 - 393 U/L   Ethyl Alcohol Level   Result Value Ref Range    Alcohol ethyl 0.09 (H) <=0.01 g/dL   INR   Result Value Ref Range    INR 1.48 (H) 0.85 - 1.15   Partial thromboplastin time   Result Value Ref Range    aPTT 36 22 - 38 Seconds   CBC with Platelets & Differential    Narrative    The following orders were created for panel order CBC with Platelets & Differential.  Procedure                               Abnormality         Status                     ---------                               -----------         ------                     CBC with platelets and d...[[id number]]  Abnormal            Final result               RBC and Platelet Morphology[[id number]]                      Final result                 Please view results for these tests on the individual orders.   CBC with platelets and differential   Result Value Ref Range    WBC Count 13.4 (H) 4.0 - 11.0 10e3/uL    RBC Count 2.99 (L) 3.80 - 5.20 10e6/uL    Hemoglobin 10.5 (L) 11.7 - 15.7 g/dL    Hematocrit 30.9 (L) 35.0 - 47.0 %     (H) 78 - 100 fL    MCH 35.1 (H) 26.5 - 33.0 pg    MCHC 34.0 31.5 - 36.5 g/dL    RDW 13.6 10.0 - 15.0 %    Platelet Count 140 (L) 150 - 450 10e3/uL    % Neutrophils 86 %    % Lymphocytes 8 %    % Monocytes 5 %    % Eosinophils 0 %     % Basophils 1 %    % Immature Granulocytes 1 %    NRBCs per 100 WBC 0 <1 /100    Absolute Neutrophils 11.3 (H) 1.6 - 8.3 10e3/uL    Absolute Lymphocytes 1.2 0.8 - 5.3 10e3/uL    Absolute Monocytes 0.6 0.0 - 1.3 10e3/uL    Mids Abs (Monos, Eos, Basos) 0.7 0.0 - 2.2 10e3/uL    Absolute Eosinophils 0.0 0.0 - 0.7 10e3/uL    Absolute Basophils 0.1 0.0 - 0.2 10e3/uL    Absolute Immature Granulocytes 0.1 <=0.4 10e3/uL    Absolute NRBCs 0.0 10e3/uL   RBC and Platelet Morphology   Result Value Ref Range    Platelet Assessment  Automated Count Confirmed. Platelet morphology is normal.     Automated Count Confirmed. Platelet morphology is normal.    RBC Morphology Confirmed RBC Indices    ABO/Rh type and screen    Narrative    The following orders were created for panel order ABO/Rh type and screen.  Procedure                               Abnormality         Status                     ---------                               -----------         ------                     Adult Type and Screen[[id number]]                            In process                   Please view results for these tests on the individual orders.   CBC with platelets differential *Canceled*    Narrative    The following orders were created for panel order CBC with platelets differential.  Procedure                               Abnormality         Status                     ---------                               -----------         ------                       Please view results for these tests on the individual orders.   Occult blood stool   Result Value Ref Range    Occult Blood Positive (A) Negative   Occult blood gastric   Result Value Ref Range    Occult Blood Gastric Positive (A) Negative    pH Gastric 3 pH    ABO/Rh type and screen    Narrative    The following orders were created for panel order ABO/Rh type and screen.  Procedure                               Abnormality         Status                     ---------                                -----------         ------                     Adult Type and Screen[[id number]]                            In process                   Please view results for these tests on the individual orders.   CT Chest/Abdomen/Pelvis w Contrast    Narrative    EXAM: CT CHEST/ABDOMEN/PELVIS W CONTRAST  LOCATION: [facility]  DATE/TIME: 5/15/2022 10:30 PM    INDICATION: Chest pain or back pain, aortic dissection suspected.  COMPARISON: 07/19/2017.  TECHNIQUE: CT scan of the chest, abdomen, and pelvis was performed following injection of IV contrast. Multiplanar reformats were obtained. Dose reduction techniques were used.   CONTRAST: 74 mL Isovue 370.    FINDINGS:   LUNGS AND PLEURA: Normal.    MEDIASTINUM/AXILLAE: Normal.    CORONARY ARTERY CALCIFICATION: Minimal.    HEPATOBILIARY: There is heterogenous density seen scattered in both lobes of the liver which is nonspecific, however on previous CT 07/19/2017 there is advanced fatty infiltration of liver, thus today's abnormal appearance of the liver parenchyma could   be secondary to some more localized areas of fatty infiltration or represent sequelae from findings worrisome for cirrhosis. There are new findings of increased portal venous pressures seen on today's CT with splenomegaly (14.7 cm) and some scattered   varicosities. Portal vein is patent and normal in caliber. The gallbladder and bile ducts are normal.    PANCREAS: Normal.    SPLEEN: Enlarged measuring 14.7 cm [id number] with some scattered varicosities consistent with portal venous hypertension.    ADRENAL GLANDS: Normal.    KIDNEYS/BLADDER: There is a 3 mm x 2 mm nonobstructive stone seen in the midpole of the right kidney. The kidneys, ureters, and bladder are otherwise normal.    BOWEL: The appendix is surgically absent. There is mild to moderate thickening seen involving the cecum through the mid transverse colon consistent with colitis which is of unknown etiology, however given  location this can be associated with C. difficile   toxicity.    LYMPH NODES: Normal.    VASCULATURE: Minimal calcification with no aneurysmal dilatation.    PELVIC ORGANS: Normal.    MUSCULOSKELETAL: Minute fatty umbilical hernia. Moderate degenerative changes lower lumbar spine particularly at the L5-S1 interspace.      Impression    IMPRESSION:  1.  Findings of right hemicolitis.    2.  Cirrhotic changes of the liver with secondary findings of portal venous hypertension. The liver is heterogenous and a nonemergent MRI examination of the liver may be of benefit to further characterize the parenchyma given the heterogenous appearance.    3.  Nonobstructive right nephrolithiasis.    4.  No aneurysm, dissection, or obvious PE seen.       Medications   ondansetron (ZOFRAN) injection 4 mg (4 mg Intravenous Given 5/16/22 0053)   sodium chloride 0.9% infusion (has no administration in time range)   ondansetron (ZOFRAN) injection 4 mg (4 mg Intravenous Given 5/15/22 1724)   0.9% sodium chloride BOLUS (0 mLs Intravenous Stopped 5/15/22 1932)   sodium chloride 0.9 % 1,000 mL with Infuvite Adult 10 mL, thiamine 100 mg, folic acid 1 mg infusion ( Intravenous Stopped 5/15/22 2216)   0.9% sodium chloride BOLUS (0 mLs Intravenous Stopped 5/15/22 2347)   pantoprazole (PROTONIX) IV push injection 40 mg (40 mg Intravenous Given 5/15/22 2221)   iopamidol (ISOVUE-370) solution 74 mL (74 mLs Intravenous Given 5/15/22 2234)   sodium chloride 0.9 % bag 500mL for CT scan flush use (58 mLs Intravenous Given 5/15/22 2233)   LORazepam (ATIVAN) injection 0.5 mg (0.5 mg Intravenous Given 5/15/22 2347)   0.9% sodium chloride BOLUS (0 mLs Intravenous Stopped 5/16/22 0032)   LORazepam (ATIVAN) injection 0.5 mg (0.5 mg Intravenous Given 5/16/22 0047)       Assessments & Plan (with Medical Decision Making) records were reviewed.  Patient was given a fluid bolus.  Zofran was given to the patient.  Labs were obtained.  Patient's white count was 13.4  hemoglobin 10.5 platelet count 140 there was a left shift.  Patient's comprehensive metabolic panel significant for an alk phos of 198 AST is 117 T bili 3.2.  Alcohol level was 0.09.  Lipase was 110.  INR was 1.48 and PTT was 36.  IV fluids were continued still having nausea given a second dose of Zofran.  The initial IV fluid was a banana bag.  Patient had an episode of vomiting which produced dark vomitus possibly blood.  Stroke route was positive and we also did a rectal exam which she had slightly dark and stool which was also positive for blood.  Another IV was started on the patient and she received another liter of fluids.  She was typed and screened.  A CT scan of the chest abdomen pelvis was done to rule out any varices or other abnormality due to the epigastric tenderness enlarged liver.  Patient was given Protonix IV.  Patient's sister and daughter talk to me independently and stated patient drank large amounts of vodka throughout the day.  This was every day.  CT scan revealed findings consistent with right hemicolitis with mild to moderate thickening seen involving the cecum through the made transverse colon consistent with colitis which is of unknown etiology..  There was also cirrhotic changes of the liver with secondary findings of portal venous hypertension the liver is heterogenous and a nonemergent MRI examination limited may be of benefit to further characterize the parenchyma there is no evidence of gallbladder obstruction or bile duct obstruction.  No aneurysm dissection or PE were seen.  Findings were discussed with patient and family.  I feel she needs to be admitted for further evaluation and care including possible scoping.  She is probably going through alcohol withdrawal at this time and will be given Ativan and continued IV fluids.  A repeat CBC is obtained.  No beds are currently available in our hospital or other surrounding hospitals so patient will be signed out to Dr. Mosquera to  await bed placement.  Patient's hemoglobin was down to 7.7 and I therefore typed and crossed the patient for 1 unit of packed red blood cells.  This is somewhat dilutional but with her actively bleeding I feel transfusion is warranted.     I have reviewed the nursing notes.    I have reviewed the findings, diagnosis, plan and need for follow up with the patient.       New Prescriptions    No medications on file       Final diagnoses:   Hematemesis with nausea   Alcoholic cirrhosis of liver without ascites (H)   Alcohol withdrawal syndrome without complication (H)       5/15/2022   [facility] EMERGENCY DEPT     Santino Phelan MD  05/16/22 [id number]

## 2022-05-16 ENCOUNTER — TRANSFERRED RECORDS (OUTPATIENT)
Dept: HEALTH INFORMATION MANAGEMENT | Facility: CLINIC | Age: 47
End: 2022-05-16
Payer: COMMERCIAL

## 2022-05-16 VITALS
OXYGEN SATURATION: 97 % | HEIGHT: 72 IN | TEMPERATURE: 98.3 F | DIASTOLIC BLOOD PRESSURE: 78 MMHG | HEART RATE: 98 BPM | WEIGHT: 150 LBS | RESPIRATION RATE: 9 BRPM | BODY MASS INDEX: 20.32 KG/M2 | SYSTOLIC BLOOD PRESSURE: 104 MMHG

## 2022-05-16 LAB
ALBUMIN UR-MCNC: NEGATIVE MG/DL
ANTIBODY SCREEN: POSITIVE
APPEARANCE UR: CLEAR
BACTERIA #/AREA URNS HPF: ABNORMAL /HPF
BASOPHILS # BLD AUTO: 0 10E3/UL (ref 0–0.2)
BASOPHILS NFR BLD AUTO: 0 %
BILIRUB UR QL STRIP: NEGATIVE
BLD PROD TYP BPU: NORMAL
BLD PROD TYP BPU: NORMAL
BLOOD COMPONENT TYPE: NORMAL
BLOOD COMPONENT TYPE: NORMAL
C COLI+JEJUNI+LARI FUSA STL QL NAA+PROBE: NOT DETECTED
C DIFF TOX B STL QL: NEGATIVE
CODING SYSTEM: NORMAL
CODING SYSTEM: NORMAL
COLOR UR AUTO: ABNORMAL
CROSSMATCH: NORMAL
CROSSMATCH: NORMAL
EC STX1 GENE STL QL NAA+PROBE: NOT DETECTED
EC STX2 GENE STL QL NAA+PROBE: NOT DETECTED
EOSINOPHIL # BLD AUTO: 0 10E3/UL (ref 0–0.7)
EOSINOPHIL NFR BLD AUTO: 0 %
ERYTHROCYTE [DISTWIDTH] IN BLOOD BY AUTOMATED COUNT: 13.9 % (ref 10–15)
ERYTHROCYTE [DISTWIDTH] IN BLOOD BY AUTOMATED COUNT: 14.2 % (ref 10–15)
GLUCOSE UR STRIP-MCNC: NEGATIVE MG/DL
HCT VFR BLD AUTO: 18.4 % (ref 35–47)
HCT VFR BLD AUTO: 20.9 % (ref 35–47)
HGB BLD-MCNC: 6.8 G/DL (ref 11.7–15.7)
HGB BLD-MCNC: 7.7 G/DL (ref 11.7–15.7)
HGB UR QL STRIP: ABNORMAL
HOLD SPECIMEN: NORMAL
HOLD SPECIMEN: NORMAL
IMM GRANULOCYTES # BLD: 0 10E3/UL
IMM GRANULOCYTES NFR BLD: 0 %
ISSUE DATE AND TIME: NORMAL
KETONES UR STRIP-MCNC: NEGATIVE MG/DL
LEUKOCYTE ESTERASE UR QL STRIP: NEGATIVE
LYMPHOCYTES # BLD AUTO: 1.2 10E3/UL (ref 0.8–5.3)
LYMPHOCYTES NFR BLD AUTO: 10 %
MAGNESIUM SERPL-MCNC: 1.2 MG/DL (ref 1.6–2.3)
MCH RBC QN AUTO: 39.9 PG (ref 26.5–33)
MCH RBC QN AUTO: 41 PG (ref 26.5–33)
MCHC RBC AUTO-ENTMCNC: 36.8 G/DL (ref 31.5–36.5)
MCHC RBC AUTO-ENTMCNC: 37 G/DL (ref 31.5–36.5)
MCV RBC AUTO: 108 FL (ref 78–100)
MCV RBC AUTO: 111 FL (ref 78–100)
MONOCYTES # BLD AUTO: 0.8 10E3/UL (ref 0–1.3)
MONOCYTES NFR BLD AUTO: 7 %
MUCOUS THREADS #/AREA URNS LPF: PRESENT /LPF
NEUTROPHILS # BLD AUTO: 9.5 10E3/UL (ref 1.6–8.3)
NEUTROPHILS NFR BLD AUTO: 83 %
NITRATE UR QL: NEGATIVE
NOROV GI+II ORF1-ORF2 JNC STL QL NAA+PR: NOT DETECTED
NRBC # BLD AUTO: 0 10E3/UL
NRBC BLD AUTO-RTO: 0 /100
PH UR STRIP: 5 [PH] (ref 5–7)
PHOSPHATE SERPL-MCNC: 3.5 MG/DL (ref 2.5–4.5)
PLATELET # BLD AUTO: 105 10E3/UL (ref 150–450)
PLATELET # BLD AUTO: 94 10E3/UL (ref 150–450)
RBC # BLD AUTO: 1.66 10E6/UL (ref 3.8–5.2)
RBC # BLD AUTO: 1.93 10E6/UL (ref 3.8–5.2)
RBC URINE: 4 /HPF
RVA NSP5 STL QL NAA+PROBE: NOT DETECTED
SALMONELLA SP RPOD STL QL NAA+PROBE: NOT DETECTED
SARS-COV-2 RNA RESP QL NAA+PROBE: NEGATIVE
SHIGELLA SP+EIEC IPAH STL QL NAA+PROBE: NOT DETECTED
SP GR UR STRIP: 1.02 (ref 1–1.03)
SPECIMEN EXPIRATION DATE: ABNORMAL
SQUAMOUS EPITHELIAL: 4 /HPF
UNIT ABO/RH: NORMAL
UNIT ABO/RH: NORMAL
UNIT NUMBER: NORMAL
UNIT NUMBER: NORMAL
UNIT STATUS: NORMAL
UNIT STATUS: NORMAL
UNIT TYPE ISBT: 5100
UNIT TYPE ISBT: 5100
UROBILINOGEN UR STRIP-MCNC: NORMAL MG/DL
V CHOL+PARA RFBL+TRKH+TNAA STL QL NAA+PR: NOT DETECTED
WBC # BLD AUTO: 11.5 10E3/UL (ref 4–11)
WBC # BLD AUTO: 8.6 10E3/UL (ref 4–11)
WBC URINE: 2 /HPF
Y ENTERO RECN STL QL NAA+PROBE: NOT DETECTED

## 2022-05-16 PROCEDURE — 87493 C DIFF AMPLIFIED PROBE: CPT | Performed by: EMERGENCY MEDICINE

## 2022-05-16 PROCEDURE — 96367 TX/PROPH/DG ADDL SEQ IV INF: CPT | Performed by: EMERGENCY MEDICINE

## 2022-05-16 PROCEDURE — P9016 RBC LEUKOCYTES REDUCED: HCPCS | Performed by: EMERGENCY MEDICINE

## 2022-05-16 PROCEDURE — 86870 RBC ANTIBODY IDENTIFICATION: CPT | Performed by: EMERGENCY MEDICINE

## 2022-05-16 PROCEDURE — 86905 BLOOD TYPING RBC ANTIGENS: CPT | Performed by: EMERGENCY MEDICINE

## 2022-05-16 PROCEDURE — 84999 UNLISTED CHEMISTRY PROCEDURE: CPT | Performed by: EMERGENCY MEDICINE

## 2022-05-16 PROCEDURE — 86900 BLOOD TYPING SEROLOGIC ABO: CPT | Performed by: EMERGENCY MEDICINE

## 2022-05-16 PROCEDURE — 96375 TX/PRO/DX INJ NEW DRUG ADDON: CPT | Performed by: EMERGENCY MEDICINE

## 2022-05-16 PROCEDURE — 36415 COLL VENOUS BLD VENIPUNCTURE: CPT | Performed by: EMERGENCY MEDICINE

## 2022-05-16 PROCEDURE — 86901 BLOOD TYPING SEROLOGIC RH(D): CPT | Performed by: EMERGENCY MEDICINE

## 2022-05-16 PROCEDURE — 250N000013 HC RX MED GY IP 250 OP 250 PS 637: Performed by: EMERGENCY MEDICINE

## 2022-05-16 PROCEDURE — 96366 THER/PROPH/DIAG IV INF ADDON: CPT

## 2022-05-16 PROCEDURE — 86880 COOMBS TEST DIRECT: CPT | Performed by: EMERGENCY MEDICINE

## 2022-05-16 PROCEDURE — 96376 TX/PRO/DX INJ SAME DRUG ADON: CPT | Performed by: EMERGENCY MEDICINE

## 2022-05-16 PROCEDURE — 250N000011 HC RX IP 250 OP 636: Performed by: EMERGENCY MEDICINE

## 2022-05-16 PROCEDURE — 86922 COMPATIBILITY TEST ANTIGLOB: CPT | Performed by: EMERGENCY MEDICINE

## 2022-05-16 PROCEDURE — 96361 HYDRATE IV INFUSION ADD-ON: CPT | Performed by: EMERGENCY MEDICINE

## 2022-05-16 PROCEDURE — 86860 RBC ANTIBODY ELUTION: CPT | Performed by: EMERGENCY MEDICINE

## 2022-05-16 PROCEDURE — 96365 THER/PROPH/DIAG IV INF INIT: CPT | Mod: 59

## 2022-05-16 PROCEDURE — 87506 IADNA-DNA/RNA PROBE TQ 6-11: CPT | Performed by: EMERGENCY MEDICINE

## 2022-05-16 PROCEDURE — 86921 COMPATIBILITY TEST INCUBATE: CPT | Performed by: EMERGENCY MEDICINE

## 2022-05-16 PROCEDURE — 258N000003 HC RX IP 258 OP 636: Performed by: EMERGENCY MEDICINE

## 2022-05-16 PROCEDURE — 81001 URINALYSIS AUTO W/SCOPE: CPT | Performed by: EMERGENCY MEDICINE

## 2022-05-16 PROCEDURE — 86920 COMPATIBILITY TEST SPIN: CPT | Performed by: EMERGENCY MEDICINE

## 2022-05-16 PROCEDURE — 86906 BLD TYPING SEROLOGIC RH PHNT: CPT | Performed by: EMERGENCY MEDICINE

## 2022-05-16 PROCEDURE — 85025 COMPLETE CBC W/AUTO DIFF WBC: CPT | Performed by: EMERGENCY MEDICINE

## 2022-05-16 PROCEDURE — 87635 SARS-COV-2 COVID-19 AMP PRB: CPT | Performed by: EMERGENCY MEDICINE

## 2022-05-16 PROCEDURE — 258N000003 HC RX IP 258 OP 636: Performed by: FAMILY MEDICINE

## 2022-05-16 PROCEDURE — 85027 COMPLETE CBC AUTOMATED: CPT | Mod: 91 | Performed by: EMERGENCY MEDICINE

## 2022-05-16 RX ORDER — LORAZEPAM 1 MG/1
1-2 TABLET ORAL EVERY 30 MIN PRN
Status: DISCONTINUED | OUTPATIENT
Start: 2022-05-16 | End: 2022-05-16 | Stop reason: HOSPADM

## 2022-05-16 RX ORDER — CEFTRIAXONE 1 G/1
1 INJECTION, POWDER, FOR SOLUTION INTRAMUSCULAR; INTRAVENOUS EVERY 24 HOURS
Status: DISCONTINUED | OUTPATIENT
Start: 2022-05-16 | End: 2022-05-16 | Stop reason: HOSPADM

## 2022-05-16 RX ORDER — GABAPENTIN 300 MG/1
900 CAPSULE ORAL EVERY 8 HOURS
Status: DISCONTINUED | OUTPATIENT
Start: 2022-05-16 | End: 2022-05-16 | Stop reason: HOSPADM

## 2022-05-16 RX ORDER — OCTREOTIDE ACETATE 100 UG/ML
50 INJECTION, SOLUTION INTRAVENOUS; SUBCUTANEOUS ONCE
Status: COMPLETED | OUTPATIENT
Start: 2022-05-16 | End: 2022-05-16

## 2022-05-16 RX ORDER — DIPHENHYDRAMINE HCL 25 MG
25 TABLET ORAL EVERY 6 HOURS PRN
COMMUNITY

## 2022-05-16 RX ORDER — HALOPERIDOL 5 MG/ML
2.5-5 INJECTION INTRAMUSCULAR EVERY 4 HOURS PRN
Status: DISCONTINUED | OUTPATIENT
Start: 2022-05-16 | End: 2022-05-16 | Stop reason: HOSPADM

## 2022-05-16 RX ORDER — DEXTROSE MONOHYDRATE, SODIUM CHLORIDE, AND POTASSIUM CHLORIDE 50; 1.49; 4.5 G/1000ML; G/1000ML; G/1000ML
INJECTION, SOLUTION INTRAVENOUS CONTINUOUS
Status: DISCONTINUED | OUTPATIENT
Start: 2022-05-16 | End: 2022-05-16 | Stop reason: HOSPADM

## 2022-05-16 RX ORDER — FLUMAZENIL 0.1 MG/ML
0.2 INJECTION, SOLUTION INTRAVENOUS
Status: DISCONTINUED | OUTPATIENT
Start: 2022-05-16 | End: 2022-05-16 | Stop reason: HOSPADM

## 2022-05-16 RX ORDER — LORAZEPAM 2 MG/ML
1-2 INJECTION INTRAMUSCULAR EVERY 30 MIN PRN
Status: DISCONTINUED | OUTPATIENT
Start: 2022-05-16 | End: 2022-05-16 | Stop reason: HOSPADM

## 2022-05-16 RX ORDER — GABAPENTIN 600 MG/1
1200 TABLET ORAL ONCE
Status: COMPLETED | OUTPATIENT
Start: 2022-05-16 | End: 2022-05-16

## 2022-05-16 RX ORDER — GABAPENTIN 300 MG/1
600 CAPSULE ORAL EVERY 8 HOURS
Status: DISCONTINUED | OUTPATIENT
Start: 2022-05-19 | End: 2022-05-16 | Stop reason: HOSPADM

## 2022-05-16 RX ORDER — GABAPENTIN 300 MG/1
300 CAPSULE ORAL EVERY 8 HOURS
Status: DISCONTINUED | OUTPATIENT
Start: 2022-05-21 | End: 2022-05-16 | Stop reason: HOSPADM

## 2022-05-16 RX ORDER — SODIUM CHLORIDE 9 MG/ML
INJECTION, SOLUTION INTRAVENOUS CONTINUOUS
Status: DISCONTINUED | OUTPATIENT
Start: 2022-05-16 | End: 2022-05-16 | Stop reason: HOSPADM

## 2022-05-16 RX ORDER — CLONIDINE HYDROCHLORIDE 0.1 MG/1
0.1 TABLET ORAL EVERY 8 HOURS
Status: DISCONTINUED | OUTPATIENT
Start: 2022-05-16 | End: 2022-05-16 | Stop reason: HOSPADM

## 2022-05-16 RX ORDER — GABAPENTIN 100 MG/1
100 CAPSULE ORAL EVERY 8 HOURS
Status: DISCONTINUED | OUTPATIENT
Start: 2022-05-23 | End: 2022-05-16 | Stop reason: HOSPADM

## 2022-05-16 RX ORDER — LORAZEPAM 2 MG/ML
0.5 INJECTION INTRAMUSCULAR ONCE
Status: COMPLETED | OUTPATIENT
Start: 2022-05-16 | End: 2022-05-16

## 2022-05-16 RX ORDER — METOPROLOL TARTRATE 1 MG/ML
5 INJECTION, SOLUTION INTRAVENOUS EVERY 6 HOURS PRN
Status: DISCONTINUED | OUTPATIENT
Start: 2022-05-16 | End: 2022-05-16 | Stop reason: HOSPADM

## 2022-05-16 RX ADMIN — CEFTRIAXONE 1 G: 1 INJECTION, POWDER, FOR SOLUTION INTRAMUSCULAR; INTRAVENOUS at 04:01

## 2022-05-16 RX ADMIN — OCTREOTIDE ACETATE 50 MCG: 100 INJECTION, SOLUTION INTRAVENOUS; SUBCUTANEOUS at 03:53

## 2022-05-16 RX ADMIN — POTASSIUM CHLORIDE, DEXTROSE MONOHYDRATE AND SODIUM CHLORIDE: 150; 5; 450 INJECTION, SOLUTION INTRAVENOUS at 17:20

## 2022-05-16 RX ADMIN — ONDANSETRON 4 MG: 2 INJECTION INTRAMUSCULAR; INTRAVENOUS at 00:53

## 2022-05-16 RX ADMIN — CLONIDINE HYDROCHLORIDE 0.1 MG: 0.1 TABLET ORAL at 12:23

## 2022-05-16 RX ADMIN — PHENOBARBITAL SODIUM 700 MG: 65 INJECTION INTRAMUSCULAR; INTRAVENOUS at 04:50

## 2022-05-16 RX ADMIN — LORAZEPAM 1 MG: 1 TABLET ORAL at 03:07

## 2022-05-16 RX ADMIN — LORAZEPAM 0.5 MG: 2 INJECTION INTRAMUSCULAR; INTRAVENOUS at 00:47

## 2022-05-16 RX ADMIN — SODIUM CHLORIDE: 9 INJECTION, SOLUTION INTRAVENOUS at 13:12

## 2022-05-16 RX ADMIN — SODIUM CHLORIDE: 9 INJECTION, SOLUTION INTRAVENOUS at 03:57

## 2022-05-16 RX ADMIN — ONDANSETRON 4 MG: 2 INJECTION INTRAMUSCULAR; INTRAVENOUS at 03:11

## 2022-05-16 RX ADMIN — GABAPENTIN 900 MG: 300 CAPSULE ORAL at 12:22

## 2022-05-16 RX ADMIN — SODIUM CHLORIDE: 9 INJECTION, SOLUTION INTRAVENOUS at 01:11

## 2022-05-16 RX ADMIN — GABAPENTIN 1200 MG: 600 TABLET, FILM COATED ORAL at 03:06

## 2022-05-16 RX ADMIN — OCTREOTIDE ACETATE 50 MCG/HR: 200 INJECTION, SOLUTION INTRAVENOUS; SUBCUTANEOUS at 04:05

## 2022-05-16 RX ADMIN — CLONIDINE HYDROCHLORIDE 0.1 MG: 0.1 TABLET ORAL at 03:07

## 2022-05-16 NOTE — ED NOTES
Pt vomited approx. 600 mls of coffee ground liquid.  MD aware.  2nd IV started.  MD aware of pt condition.

## 2022-05-16 NOTE — ED NOTES
[facility] blood bank wants to know if the patient has ever had a blood transfusion, pt said she has not ever had a blood transfusion; blood bank updated.

## 2022-05-16 NOTE — ED PROVIDER NOTES
Emergency Department Patient Sign-out       Brief HPI:  This is a 46 year old female signed out to me by Dr. Tristan.  See initial ED Provider note for details of the presentation.          Significant Events prior to my assuming care: 2 unit blood transfusion for upper GI bleed in the setting of alcoholic cirrhosis stable during the night since then without signs of withdrawal but loaded with phenobarb last night.      Exam:   Patient Vitals for the past 24 hrs:   BP Temp Temp src Pulse Resp SpO2 Height Weight   05/16/22 1330 93/57 -- -- 100 -- 94 % -- --   05/16/22 1320 101/64 -- -- -- -- 92 % -- --   05/16/22 1300 98/64 -- -- 105 -- 91 % -- --   05/16/22 1230 104/65 -- -- 112 -- 95 % -- --   05/16/22 1200 105/69 -- -- 109 -- 95 % -- --   05/16/22 1100 111/66 -- -- 103 -- 93 % -- --   05/16/22 1000 118/74 -- -- 112 -- 94 % -- --   05/16/22 0800 119/83 -- -- 120 18 98 % -- --   05/16/22 0730 116/73 -- -- 108 10 94 % -- --   05/16/22 0700 99/58 -- -- 109 16 (!) 89 % -- --   05/16/22 0600 91/44 -- -- 108 18 93 % -- --   05/16/22 0500 104/63 -- -- (!) 121 18 90 % -- --   05/16/22 0330 122/75 -- -- (!) 124 16 -- -- --   05/16/22 0300 124/74 -- -- (!) 130 11 -- -- --   05/16/22 0200 106/71 -- -- (!) 124 17 94 % -- --   05/16/22 0100 99/64 -- -- (!) 123 15 95 % -- --   05/16/22 0045 106/71 -- -- (!) 125 16 92 % -- --   05/16/22 0030 118/72 -- -- (!) 125 17 95 % -- --   05/16/22 0015 127/79 -- -- (!) 124 (!) 33 96 % -- --   05/16/22 0000 -- -- -- (!) 140 10 98 % -- --   05/15/22 2330 133/76 -- -- (!) 129 13 98 % -- --   05/15/22 2300 133/79 -- -- (!) 121 12 97 % -- --   05/15/22 2200 -- -- -- (!) 125 -- 95 % -- --   05/15/22 2145 132/64 -- -- (!) 129 23 96 % -- --   05/15/22 2130 132/81 -- -- (!) 130 23 95 % -- --   05/15/22 2115 134/81 -- -- (!) 144 24 95 % -- --   05/15/22 2100 122/80 -- -- 118 11 97 % -- --   05/15/22 2030 123/66 -- -- 116 11 94 % -- --   05/15/22 2000 124/61 -- -- 112 16 95 % -- --   05/15/22  1945 132/83 -- -- 112 11 98 % -- --   05/15/22 1845 137/80 -- -- 116 18 94 % -- --   05/15/22 1830 130/81 -- -- 116 13 95 % -- --   05/15/22 1815 138/86 -- -- 105 16 95 % -- --   05/15/22 1800 131/83 -- -- 112 17 94 % -- --   05/15/22 1730 128/83 -- -- 117 14 95 % -- --   05/15/22 1714 (!) 140/92 98.8  F (37.1  C) Oral (!) 124 -- 97 % 1.829 m (6') 68 kg (150 lb)           ED RESULTS:   Results for orders placed or performed during the hospital encounter of 05/15/22 (from the past 24 hour(s))   [facility] Draw     Status: None    Collection Time: 05/15/22  5:23 PM    Narrative    The following orders were created for panel order [facility] Draw.  Procedure                               Abnormality         Status                     ---------                               -----------         ------                     Extra Blue Top Tube[[id number]]                              Final result               Extra Red Top Tube[[id number]]                               Final result               Extra Green Top (Lithium...[[id number]]                      Final result               Extra Purple Top Tube[[id number]]                            Final result                 Please view results for these tests on the individual orders.   Extra Blue Top Tube     Status: None    Collection Time: 05/15/22  5:23 PM   Result Value Ref Range    Hold Specimen JIC    Extra Red Top Tube     Status: None    Collection Time: 05/15/22  5:23 PM   Result Value Ref Range    Hold Specimen JIC    Extra Green Top (Lithium Heparin) Tube     Status: None    Collection Time: 05/15/22  5:23 PM   Result Value Ref Range    Hold Specimen JIC    Extra Purple Top Tube     Status: None    Collection Time: 05/15/22  5:23 PM   Result Value Ref Range    Hold Specimen JIC    CBC with platelets differential *Canceled*     Status: None ()    Collection Time: 05/15/22  5:23 PM    Narrative    The following orders were created for panel order CBC with platelets  differential.  Procedure                               Abnormality         Status                     ---------                               -----------         ------                     CBC with platelets and d...[[id number]]                                                   Please view results for these tests on the individual orders.   Comprehensive metabolic panel     Status: Abnormal    Collection Time: 05/15/22  5:23 PM   Result Value Ref Range    Sodium 140 133 - 144 mmol/L    Potassium 3.5 3.4 - 5.3 mmol/L    Chloride 106 94 - 109 mmol/L    Carbon Dioxide (CO2) 22 20 - 32 mmol/L    Anion Gap 12 3 - 14 mmol/L    Urea Nitrogen 16 7 - 30 mg/dL    Creatinine 0.60 0.52 - 1.04 mg/dL    Calcium 8.9 8.5 - 10.1 mg/dL    Glucose 123 (H) 70 - 99 mg/dL    Alkaline Phosphatase 198 (H) 40 - 150 U/L     (H) 0 - 45 U/L    ALT 36 0 - 50 U/L    Protein Total 8.3 6.8 - 8.8 g/dL    Albumin 3.3 (L) 3.4 - 5.0 g/dL    Bilirubin Total 3.2 (H) 0.2 - 1.3 mg/dL    GFR Estimate >90 >60 mL/min/1.73m2   Lipase     Status: Normal    Collection Time: 05/15/22  5:23 PM   Result Value Ref Range    Lipase 110 73 - 393 U/L   Ethyl Alcohol Level     Status: Abnormal    Collection Time: 05/15/22  5:23 PM   Result Value Ref Range    Alcohol ethyl 0.09 (H) <=0.01 g/dL   INR     Status: Abnormal    Collection Time: 05/15/22  5:23 PM   Result Value Ref Range    INR 1.48 (H) 0.85 - 1.15   Partial thromboplastin time     Status: Normal    Collection Time: 05/15/22  5:23 PM   Result Value Ref Range    aPTT 36 22 - 38 Seconds   CBC with Platelets & Differential     Status: Abnormal    Collection Time: 05/15/22  5:23 PM    Narrative    The following orders were created for panel order CBC with Platelets & Differential.  Procedure                               Abnormality         Status                     ---------                               -----------         ------                     CBC with platelets and d...[[id number]]  Abnormal             Final result               RBC and Platelet Morphology[[id number]]                      Final result                 Please view results for these tests on the individual orders.   CBC with platelets and differential     Status: Abnormal    Collection Time: 05/15/22  5:23 PM   Result Value Ref Range    WBC Count 13.4 (H) 4.0 - 11.0 10e3/uL    RBC Count 2.99 (L) 3.80 - 5.20 10e6/uL    Hemoglobin 10.5 (L) 11.7 - 15.7 g/dL    Hematocrit 30.9 (L) 35.0 - 47.0 %     (H) 78 - 100 fL    MCH 35.1 (H) 26.5 - 33.0 pg    MCHC 34.0 31.5 - 36.5 g/dL    RDW 13.6 10.0 - 15.0 %    Platelet Count 140 (L) 150 - 450 10e3/uL    % Neutrophils 86 %    % Lymphocytes 8 %    % Monocytes 5 %    % Eosinophils 0 %    % Basophils 1 %    % Immature Granulocytes 1 %    NRBCs per 100 WBC 0 <1 /100    Absolute Neutrophils 11.3 (H) 1.6 - 8.3 10e3/uL    Absolute Lymphocytes 1.2 0.8 - 5.3 10e3/uL    Absolute Monocytes 0.6 0.0 - 1.3 10e3/uL    Mids Abs (Monos, Eos, Basos) 0.7 0.0 - 2.2 10e3/uL    Absolute Eosinophils 0.0 0.0 - 0.7 10e3/uL    Absolute Basophils 0.1 0.0 - 0.2 10e3/uL    Absolute Immature Granulocytes 0.1 <=0.4 10e3/uL    Absolute NRBCs 0.0 10e3/uL   RBC and Platelet Morphology     Status: None    Collection Time: 05/15/22  5:23 PM   Result Value Ref Range    Platelet Assessment  Automated Count Confirmed. Platelet morphology is normal.     Automated Count Confirmed. Platelet morphology is normal.    RBC Morphology Confirmed RBC Indices    ABO/Rh type and screen *Canceled*     Status: None ()    Collection Time: 05/15/22  5:23 PM    Narrative    The following orders were created for panel order ABO/Rh type and screen.  Procedure                               Abnormality         Status                     ---------                               -----------         ------                     Adult Type and Screen[[id number]]                                                         Please view results for these tests on the individual  orders.   Magnesium     Status: Abnormal    Collection Time: 05/15/22  5:23 PM   Result Value Ref Range    Magnesium 1.2 (L) 1.6 - 2.3 mg/dL   Phosphorus     Status: Normal    Collection Time: 05/15/22  5:23 PM   Result Value Ref Range    Phosphorus 3.5 2.5 - 4.5 mg/dL   CBC with platelets differential *Canceled*     Status: None ()    Collection Time: 05/15/22  6:28 PM    Narrative    The following orders were created for panel order CBC with platelets differential.  Procedure                               Abnormality         Status                     ---------                               -----------         ------                       Please view results for these tests on the individual orders.   Occult blood stool     Status: Abnormal    Collection Time: 05/15/22  9:18 PM   Result Value Ref Range    Occult Blood Positive (A) Negative   ABO/Rh type and screen *Canceled*     Status: None ()    Collection Time: 05/15/22  9:22 PM    Narrative    The following orders were created for panel order ABO/Rh type and screen.  Procedure                               Abnormality         Status                     ---------                               -----------         ------                       Please view results for these tests on the individual orders.   Occult blood gastric     Status: Abnormal    Collection Time: 05/15/22  9:25 PM   Result Value Ref Range    Occult Blood Gastric Positive (A) Negative    pH Gastric 3 pH    ABO/Rh type and screen     Status: Abnormal    Collection Time: 05/15/22  9:29 PM    Narrative    The following orders were created for panel order ABO/Rh type and screen.  Procedure                               Abnormality         Status                     ---------                               -----------         ------                     Adult Type and Screen[[id number]]        Abnormal            Edited Result - FINAL        Please view results for these tests on the individual orders.    Adult Type and Screen     Status: Abnormal    Collection Time: 05/15/22  9:29 PM   Result Value Ref Range    ABO/RH(D) O POS     Antibody Screen Positive (A) Negative    SPECIMEN EXPIRATION DATE [id number]    Antibody Screen - Red Cell     Status: Abnormal    Collection Time: 05/15/22  9:29 PM   Result Value Ref Range    Antibody Screen Positive (A) Negative    SPECIMEN EXPIRATION DATE [id number]    CT Chest/Abdomen/Pelvis w Contrast     Status: None    Collection Time: 05/15/22 10:50 PM    Narrative    EXAM: CT CHEST/ABDOMEN/PELVIS W CONTRAST  LOCATION: [facility]  DATE/TIME: 5/15/2022 10:30 PM    INDICATION: Chest pain or back pain, aortic dissection suspected.  COMPARISON: 07/19/2017.  TECHNIQUE: CT scan of the chest, abdomen, and pelvis was performed following injection of IV contrast. Multiplanar reformats were obtained. Dose reduction techniques were used.   CONTRAST: 74 mL Isovue 370.    FINDINGS:   LUNGS AND PLEURA: Normal.    MEDIASTINUM/AXILLAE: Normal.    CORONARY ARTERY CALCIFICATION: Minimal.    HEPATOBILIARY: There is heterogenous density seen scattered in both lobes of the liver which is nonspecific, however on previous CT 07/19/2017 there is advanced fatty infiltration of liver, thus today's abnormal appearance of the liver parenchyma could   be secondary to some more localized areas of fatty infiltration or represent sequelae from findings worrisome for cirrhosis. There are new findings of increased portal venous pressures seen on today's CT with splenomegaly (14.7 cm) and some scattered   varicosities. Portal vein is patent and normal in caliber. The gallbladder and bile ducts are normal.    PANCREAS: Normal.    SPLEEN: Enlarged measuring 14.7 cm agiz-ep-paxo with some scattered varicosities consistent with portal venous hypertension.    ADRENAL GLANDS: Normal.    KIDNEYS/BLADDER: There is a 3 mm x 2 mm nonobstructive stone seen in the midpole of the right  kidney. The kidneys, ureters, and bladder are otherwise normal.    BOWEL: The appendix is surgically absent. There is mild to moderate thickening seen involving the cecum through the mid transverse colon consistent with colitis which is of unknown etiology, however given location this can be associated with C. difficile   toxicity.    LYMPH NODES: Normal.    VASCULATURE: Minimal calcification with no aneurysmal dilatation.    PELVIC ORGANS: Normal.    MUSCULOSKELETAL: Minute fatty umbilical hernia. Moderate degenerative changes lower lumbar spine particularly at the L5-S1 interspace.      Impression    IMPRESSION:  1.  Findings of right hemicolitis.    2.  Cirrhotic changes of the liver with secondary findings of portal venous hypertension. The liver is heterogenous and a nonemergent MRI examination of the liver may be of benefit to further characterize the parenchyma given the heterogenous appearance.    3.  Nonobstructive right nephrolithiasis.    4.  No aneurysm, dissection, or obvious PE seen.   CBC with platelets, differential     Status: Abnormal    Collection Time: 05/16/22 12:56 AM    Narrative    The following orders were created for panel order CBC with platelets, differential.  Procedure                               Abnormality         Status                     ---------                               -----------         ------                     CBC with platelets and d...[[id number]]  Abnormal            Final result                 Please view results for these tests on the individual orders.   CBC with platelets and differential     Status: Abnormal    Collection Time: 05/16/22 12:56 AM   Result Value Ref Range    WBC Count 11.5 (H) 4.0 - 11.0 10e3/uL    RBC Count 1.93 (L) 3.80 - 5.20 10e6/uL    Hemoglobin 7.7 (L) 11.7 - 15.7 g/dL    Hematocrit 20.9 (L) 35.0 - 47.0 %     (H) 78 - 100 fL    MCH 39.9 (H) 26.5 - 33.0 pg    MCHC 36.8 (H) 31.5 - 36.5 g/dL    RDW 13.9 10.0 - 15.0 %    Platelet Count  105 (L) 150 - 450 10e3/uL    % Neutrophils 83 %    % Lymphocytes 10 %    % Monocytes 7 %    % Eosinophils 0 %    % Basophils 0 %    % Immature Granulocytes 0 %    NRBCs per 100 WBC 0 <1 /100    Absolute Neutrophils 9.5 (H) 1.6 - 8.3 10e3/uL    Absolute Lymphocytes 1.2 0.8 - 5.3 10e3/uL    Absolute Monocytes 0.8 0.0 - 1.3 10e3/uL    Absolute Eosinophils 0.0 0.0 - 0.7 10e3/uL    Absolute Basophils 0.0 0.0 - 0.2 10e3/uL    Absolute Immature Granulocytes 0.0 <=0.4 10e3/uL    Absolute NRBCs 0.0 10e3/uL   Prepare red blood cells (unit)     Status: None (Preliminary result)    Collection Time: 05/16/22  1:58 AM   Result Value Ref Range    CROSSMATCH COMPATIBLE     UNIT ABO/RH O Pos     Unit Number [id number]     Unit Status Ready     Blood Component Type Red Blood Cells     Product Code [id number]     CODING SYSTEM [id number]     UNIT TYPE ISBT 5100    CBC with platelets     Status: Abnormal    Collection Time: 05/16/22  6:05 AM   Result Value Ref Range    WBC Count 8.6 4.0 - 11.0 10e3/uL    RBC Count 1.66 (L) 3.80 - 5.20 10e6/uL    Hemoglobin 6.8 (LL) 11.7 - 15.7 g/dL    Hematocrit 18.4 (L) 35.0 - 47.0 %     (H) 78 - 100 fL    MCH 41.0 (H) 26.5 - 33.0 pg    MCHC 37.0 (H) 31.5 - 36.5 g/dL    RDW 14.2 10.0 - 15.0 %    Platelet Count 94 (L) 150 - 450 10e3/uL   Extra Tube     Status: None    Collection Time: 05/16/22  6:05 AM    Narrative    The following orders were created for panel order Extra Tube.  Procedure                               Abnormality         Status                     ---------                               -----------         ------                     Extra Blue Top Tube[[id number]]                              Final result               Extra Green Top (Lithium...[[id number]]                      Final result                 Please view results for these tests on the individual orders.   Extra Blue Top Tube     Status: None    Collection Time: 05/16/22  6:05 AM   Result Value Ref Range    Hold  Specimen JIC    Extra Green Top (Lithium Heparin) Tube     Status: None    Collection Time: 05/16/22  6:05 AM   Result Value Ref Range    Hold Specimen JIC    Prepare red blood cells (unit)     Status: None (Preliminary result)    Collection Time: 05/16/22  6:35 AM   Result Value Ref Range    CROSSMATCH COMPATIBLE     UNIT ABO/RH O Pos     Unit Number [id number]     Unit Status Ready     Blood Component Type Red Blood Cells     Product Code [id number]     CODING SYSTEM [id number]     UNIT TYPE ISBT 5100    UA with Microscopic reflex to Culture     Status: Abnormal    Collection Time: 05/16/22  8:12 AM    Specimen: Urine, Clean Catch   Result Value Ref Range    Color Urine Love (A) Colorless, Straw, Light Yellow, Yellow    Appearance Urine Clear Clear    Glucose Urine Negative Negative mg/dL    Bilirubin Urine Negative Negative    Ketones Urine Negative Negative mg/dL    Specific Gravity Urine 1.025 1.003 - 1.035    Blood Urine Small (A) Negative    pH Urine 5.0 5.0 - 7.0    Protein Albumin Urine Negative Negative mg/dL    Urobilinogen Urine Normal Normal, 2.0 mg/dL    Nitrite Urine Negative Negative    Leukocyte Esterase Urine Negative Negative    Bacteria Urine Few (A) None Seen /HPF    Mucus Urine Present (A) None Seen /LPF    RBC Urine 4 (H) <=2 /HPF    WBC Urine 2 <=5 /HPF    Squamous Epithelials Urine 4 (H) <=1 /HPF    Narrative    Urine Culture not indicated   Asymptomatic COVID-19 Virus (Coronavirus) by PCR Nasopharyngeal     Status: Normal    Collection Time: 05/16/22  8:44 AM    Specimen: Nasopharyngeal; Swab   Result Value Ref Range    SARS CoV2 PCR Negative Negative    Narrative    Testing was performed using the olivia  SARS-CoV-2 & Influenza A/B Assay on the olivia  [employer]  System.  This test should be ordered for the detection of SARS-COV-2 in individuals who meet SARS-CoV-2 clinical and/or epidemiological criteria. Test performance is unknown in asymptomatic patients.  This test is for in vitro diagnostic  use under the FDA EUA for laboratories certified under CLIA to perform moderate and/or high complexity testing. This test has not been FDA cleared or approved.  A negative test does not rule out the presence of PCR inhibitors in the specimen or target RNA in concentration below the limit of detection for the assay. The possibility of a false negative should be considered if the patient's recent exposure or clinical presentation suggests COVID-19.  [facility] are certified under the Clinical Laboratory Improvement Amendments of 1988 (CLIA-88) as qualified to perform moderate and/or high complexity laboratory testing.       ED MEDICATIONS:   Medications   sodium chloride 0.9% infusion ( Intravenous New Bag 5/16/22 1312)   flumazenil (ROMAZICON) injection 0.2 mg (has no administration in time range)   metoprolol (LOPRESSOR) injection 5 mg (has no administration in time range)   cloNIDine (CATAPRES) tablet 0.1 mg (0.1 mg Oral Given 5/16/22 1223)   haloperidol lactate (HALDOL) injection 2.5-5 mg (has no administration in time range)   LORazepam (ATIVAN) tablet 1-2 mg (1 mg Oral Given 5/16/22 0307)     Or   LORazepam (ATIVAN) injection 1-2 mg ( Intravenous See Alternative 5/16/22 0307)   gabapentin (NEURONTIN) capsule 900 mg (900 mg Oral Given 5/16/22 1222)   gabapentin (NEURONTIN) capsule 600 mg (has no administration in time range)   gabapentin (NEURONTIN) capsule 300 mg (has no administration in time range)   gabapentin (NEURONTIN) capsule 100 mg (has no administration in time range)   octreotide (sandoSTATIN) 1,250 mcg in D5W 250 mL (50 mcg/hr Intravenous Rate/Dose Verify 5/16/22 0615)   cefTRIAXone (ROCEPHIN) 1 g vial to attach to  mL bag for ADULTS or NS 50 mL bag for PEDS (0 g Intravenous Stopped 5/16/22 0448)   ondansetron (ZOFRAN) injection 4 mg (4 mg Intravenous Given 5/15/22 1724)   0.9% sodium chloride BOLUS (0 mLs Intravenous Stopped 5/15/22 1932)   sodium chloride 0.9 % 1,000 mL  with Infuvite Adult 10 mL, thiamine 100 mg, folic acid 1 mg infusion ( Intravenous Stopped 5/15/22 2216)   ondansetron (ZOFRAN) injection 4 mg (4 mg Intravenous Given 5/16/22 0311)   0.9% sodium chloride BOLUS (0 mLs Intravenous Stopped 5/15/22 2347)   pantoprazole (PROTONIX) IV push injection 40 mg (40 mg Intravenous Given 5/15/22 2221)   iopamidol (ISOVUE-370) solution 74 mL (74 mLs Intravenous Given 5/15/22 2234)   sodium chloride 0.9 % bag 500mL for CT scan flush use (58 mLs Intravenous Given 5/15/22 2233)   LORazepam (ATIVAN) injection 0.5 mg (0.5 mg Intravenous Given 5/15/22 2347)   0.9% sodium chloride BOLUS (0 mLs Intravenous Stopped 5/16/22 0032)   LORazepam (ATIVAN) injection 0.5 mg (0.5 mg Intravenous Given 5/16/22 0047)   gabapentin (NEURONTIN) tablet 1,200 mg (1,200 mg Oral Given 5/16/22 0306)   octreotide (sandoSTATIN) injection 50 mcg (50 mcg Intravenous Given 5/16/22 0353)   PHENobarbital (LUMINAL) 700 mg in sodium chloride 0.9 % 100 mL intermittent infusion (700 mg Intravenous Given 5/16/22 0450)         Impression:    ICD-10-CM    1. Hematemesis with nausea  K92.0        Plan:    Pending studies include none.    Inpatient bed identified in [place].  Dr. Tristan was still present in the department and signed the patient over to the accepting physician at the hospital in [place].    MD Palak Knox Joseph, MD  05/16/22 2216

## 2022-05-16 NOTE — ED PROVIDER NOTES
Emergency Department Patient Sign-out       Brief HPI:  This is a 46 year old female signed out to me by Dr. Dunn .  See initial ED Provider note for details of the presentation.  Patient with alcohol use disorder presents with hematemesis.  Anemic, transfused 1 unit packed red cells, repeat hemoglobin this morning down to 6.8.  CT scan abdomen pelvis significant for right hemic colitis, cirrhotic changes with portal venous hypertension.  Patient is treated with ceftriaxone, ondansetron, octreotide, Protonix and lorazepam.  Tachycardic overnight, this morning heart rate in the 100-110 range.  No current complaint upon evaluation at 0630.  Skin pink warm and dry, oxygen saturation 90% on room air while sleeping goes up to mid 90s when awake.  Abdomen is soft bowel sounds are present, no significant tenderness to palpation.    Given hemoglobin drifting down to 6.8 unit of packed red cells is ordered and pending.  Awaiting bed at Center with [facility] available given hematemesis, anemia, cirrhosis, possible esophageal variceal bleed.           Significant Events prior to my assuming care:               Exam:   Patient Vitals for the past 24 hrs:   BP Temp Temp src Pulse Resp SpO2 Height Weight   05/16/22 0600 91/44 -- -- 108 18 93 % -- --   05/16/22 0500 104/63 -- -- (!) 121 18 90 % -- --   05/16/22 0330 122/75 -- -- (!) 124 16 -- -- --   05/16/22 0300 124/74 -- -- (!) 130 11 -- -- --   05/16/22 0200 106/71 -- -- (!) 124 17 94 % -- --   05/16/22 0100 99/64 -- -- (!) 123 15 95 % -- --   05/16/22 0045 106/71 -- -- (!) 125 16 92 % -- --   05/16/22 0030 118/72 -- -- (!) 125 17 95 % -- --   05/16/22 0015 127/79 -- -- (!) 124 (!) 33 96 % -- --   05/16/22 0000 -- -- -- (!) 140 10 98 % -- --   05/15/22 2330 133/76 -- -- (!) 129 13 98 % -- --   05/15/22 2300 133/79 -- -- (!) 121 12 97 % -- --   05/15/22 2200 -- -- -- (!) 125 -- 95 % -- --   05/15/22 2145 132/64 -- -- (!) 129 23 96 % -- --   05/15/22 2130 132/81 -- -- (!) 130 23  95 % -- --   05/15/22 2115 134/81 -- -- (!) 144 24 95 % -- --   05/15/22 2100 122/80 -- -- 118 11 97 % -- --   05/15/22 2030 123/66 -- -- 116 11 94 % -- --   05/15/22 2000 124/61 -- -- 112 16 95 % -- --   05/15/22 1945 132/83 -- -- 112 11 98 % -- --   05/15/22 1845 137/80 -- -- 116 18 94 % -- --   05/15/22 1830 130/81 -- -- 116 13 95 % -- --   05/15/22 1815 138/86 -- -- 105 16 95 % -- --   05/15/22 1800 131/83 -- -- 112 17 94 % -- --   05/15/22 1730 128/83 -- -- 117 14 95 % -- --   05/15/22 1714 (!) 140/92 98.8  F (37.1  C) Oral (!) 124 -- 97 % 1.829 m (6') 68 kg (150 lb)           ED RESULTS:   Results for orders placed or performed during the hospital encounter of 05/15/22 (from the past 24 hour(s))   [place] Draw     Status: None    Collection Time: 05/15/22  5:23 PM    Narrative    The following orders were created for panel order [place] Draw.  Procedure                               Abnormality         Status                     ---------                               -----------         ------                     Extra Blue Top Tube[[id number]]                              Final result               Extra Red Top Tube[[id number]]                               Final result               Extra Green Top (Lithium...[[id number]]                      Final result               Extra Purple Top Tube[[id number]]                            Final result                 Please view results for these tests on the individual orders.   Extra Blue Top Tube     Status: None    Collection Time: 05/15/22  5:23 PM   Result Value Ref Range    Hold Specimen [facility]    Extra Red Top Tube     Status: None    Collection Time: 05/15/22  5:23 PM   Result Value Ref Range    Hold Specimen [facility]    Extra Green Top (Lithium Heparin) Tube     Status: None    Collection Time: 05/15/22  5:23 PM   Result Value Ref Range    Hold Specimen [facility]    Extra Purple Top Tube     Status: None    Collection Time: 05/15/22  5:23 PM   Result Value Ref Range    Hold  Specimen [facility]    CBC with platelets differential *Canceled*     Status: None ()    Collection Time: 05/15/22  5:23 PM    Narrative    The following orders were created for panel order CBC with platelets differential.  Procedure                               Abnormality         Status                     ---------                               -----------         ------                     CBC with platelets and d...[[id number]]                                                   Please view results for these tests on the individual orders.   Comprehensive metabolic panel     Status: Abnormal    Collection Time: 05/15/22  5:23 PM   Result Value Ref Range    Sodium 140 133 - 144 mmol/L    Potassium 3.5 3.4 - 5.3 mmol/L    Chloride 106 94 - 109 mmol/L    Carbon Dioxide (CO2) 22 20 - 32 mmol/L    Anion Gap 12 3 - 14 mmol/L    Urea Nitrogen 16 7 - 30 mg/dL    Creatinine 0.60 0.52 - 1.04 mg/dL    Calcium 8.9 8.5 - 10.1 mg/dL    Glucose 123 (H) 70 - 99 mg/dL    Alkaline Phosphatase 198 (H) 40 - 150 U/L     (H) 0 - 45 U/L    ALT 36 0 - 50 U/L    Protein Total 8.3 6.8 - 8.8 g/dL    Albumin 3.3 (L) 3.4 - 5.0 g/dL    Bilirubin Total 3.2 (H) 0.2 - 1.3 mg/dL    GFR Estimate >90 >60 mL/min/1.73m2   Lipase     Status: Normal    Collection Time: 05/15/22  5:23 PM   Result Value Ref Range    Lipase 110 73 - 393 U/L   Ethyl Alcohol Level     Status: Abnormal    Collection Time: 05/15/22  5:23 PM   Result Value Ref Range    Alcohol ethyl 0.09 (H) <=0.01 g/dL   INR     Status: Abnormal    Collection Time: 05/15/22  5:23 PM   Result Value Ref Range    INR 1.48 (H) 0.85 - 1.15   Partial thromboplastin time     Status: Normal    Collection Time: 05/15/22  5:23 PM   Result Value Ref Range    aPTT 36 22 - 38 Seconds   CBC with Platelets & Differential     Status: Abnormal    Collection Time: 05/15/22  5:23 PM    Narrative    The following orders were created for panel order CBC with Platelets & Differential.  Procedure                                Abnormality         Status                     ---------                               -----------         ------                     CBC with platelets and d...[[id number]]  Abnormal            Final result               RBC and Platelet Morphology[[id number]]                      Final result                 Please view results for these tests on the individual orders.   CBC with platelets and differential     Status: Abnormal    Collection Time: 05/15/22  5:23 PM   Result Value Ref Range    WBC Count 13.4 (H) 4.0 - 11.0 10e3/uL    RBC Count 2.99 (L) 3.80 - 5.20 10e6/uL    Hemoglobin 10.5 (L) 11.7 - 15.7 g/dL    Hematocrit 30.9 (L) 35.0 - 47.0 %     (H) 78 - 100 fL    MCH 35.1 (H) 26.5 - 33.0 pg    MCHC 34.0 31.5 - 36.5 g/dL    RDW 13.6 10.0 - 15.0 %    Platelet Count 140 (L) 150 - 450 10e3/uL    % Neutrophils 86 %    % Lymphocytes 8 %    % Monocytes 5 %    % Eosinophils 0 %    % Basophils 1 %    % Immature Granulocytes 1 %    NRBCs per 100 WBC 0 <1 /100    Absolute Neutrophils 11.3 (H) 1.6 - 8.3 10e3/uL    Absolute Lymphocytes 1.2 0.8 - 5.3 10e3/uL    Absolute Monocytes 0.6 0.0 - 1.3 10e3/uL    Mids Abs (Monos, Eos, Basos) 0.7 0.0 - 2.2 10e3/uL    Absolute Eosinophils 0.0 0.0 - 0.7 10e3/uL    Absolute Basophils 0.1 0.0 - 0.2 10e3/uL    Absolute Immature Granulocytes 0.1 <=0.4 10e3/uL    Absolute NRBCs 0.0 10e3/uL   RBC and Platelet Morphology     Status: None    Collection Time: 05/15/22  5:23 PM   Result Value Ref Range    Platelet Assessment  Automated Count Confirmed. Platelet morphology is normal.     Automated Count Confirmed. Platelet morphology is normal.    RBC Morphology Confirmed RBC Indices    ABO/Rh type and screen *Canceled*     Status: None ()    Collection Time: 05/15/22  5:23 PM    Narrative    The following orders were created for panel order ABO/Rh type and screen.  Procedure                               Abnormality         Status                     ---------                                -----------         ------                     Adult Type and Screen[[id number]]                                                         Please view results for these tests on the individual orders.   Magnesium     Status: Abnormal    Collection Time: 05/15/22  5:23 PM   Result Value Ref Range    Magnesium 1.2 (L) 1.6 - 2.3 mg/dL   Phosphorus     Status: Normal    Collection Time: 05/15/22  5:23 PM   Result Value Ref Range    Phosphorus 3.5 2.5 - 4.5 mg/dL   CBC with platelets differential *Canceled*     Status: None ()    Collection Time: 05/15/22  6:28 PM    Narrative    The following orders were created for panel order CBC with platelets differential.  Procedure                               Abnormality         Status                     ---------                               -----------         ------                       Please view results for these tests on the individual orders.   Occult blood stool     Status: Abnormal    Collection Time: 05/15/22  9:18 PM   Result Value Ref Range    Occult Blood Positive (A) Negative   ABO/Rh type and screen *Canceled*     Status: None ()    Collection Time: 05/15/22  9:22 PM    Narrative    The following orders were created for panel order ABO/Rh type and screen.  Procedure                               Abnormality         Status                     ---------                               -----------         ------                       Please view results for these tests on the individual orders.   Occult blood gastric     Status: Abnormal    Collection Time: 05/15/22  9:25 PM   Result Value Ref Range    Occult Blood Gastric Positive (A) Negative    pH Gastric 3 pH    ABO/Rh type and screen     Status: None    Collection Time: 05/15/22  9:29 PM    Narrative    The following orders were created for panel order ABO/Rh type and screen.  Procedure                               Abnormality         Status                     ---------                                -----------         ------                     Adult Type and Screen[[id number]]                            Final result                 Please view results for these tests on the individual orders.   Adult Type and Screen     Status: None    Collection Time: 05/15/22  9:29 PM   Result Value Ref Range    ABO/RH(D) O POS     SPECIMEN EXPIRATION DATE [id number]    Antibody Screen - Red Cell     Status: Abnormal    Collection Time: 05/15/22  9:29 PM   Result Value Ref Range    Antibody Screen Positive (A) Negative    SPECIMEN EXPIRATION DATE [id number]    CT Chest/Abdomen/Pelvis w Contrast     Status: None    Collection Time: 05/15/22 10:50 PM    Narrative    EXAM: CT CHEST/ABDOMEN/PELVIS W CONTRAST  LOCATION: [facility]  DATE/TIME: 5/15/2022 10:30 PM    INDICATION: Chest pain or back pain, aortic dissection suspected.  COMPARISON: 07/19/2017.  TECHNIQUE: CT scan of the chest, abdomen, and pelvis was performed following injection of IV contrast. Multiplanar reformats were obtained. Dose reduction techniques were used.   CONTRAST: 74 mL Isovue 370.    FINDINGS:   LUNGS AND PLEURA: Normal.    MEDIASTINUM/AXILLAE: Normal.    CORONARY ARTERY CALCIFICATION: Minimal.    HEPATOBILIARY: There is heterogenous density seen scattered in both lobes of the liver which is nonspecific, however on previous CT 07/19/2017 there is advanced fatty infiltration of liver, thus today's abnormal appearance of the liver parenchyma could   be secondary to some more localized areas of fatty infiltration or represent sequelae from findings worrisome for cirrhosis. There are new findings of increased portal venous pressures seen on today's CT with splenomegaly (14.7 cm) and some scattered   varicosities. Portal vein is patent and normal in caliber. The gallbladder and bile ducts are normal.    PANCREAS: Normal.    SPLEEN: Enlarged measuring 14.7 cm [id number] with some scattered varicosities consistent with  portal venous hypertension.    ADRENAL GLANDS: Normal.    KIDNEYS/BLADDER: There is a 3 mm x 2 mm nonobstructive stone seen in the midpole of the right kidney. The kidneys, ureters, and bladder are otherwise normal.    BOWEL: The appendix is surgically absent. There is mild to moderate thickening seen involving the cecum through the mid transverse colon consistent with colitis which is of unknown etiology, however given location this can be associated with C. difficile   toxicity.    LYMPH NODES: Normal.    VASCULATURE: Minimal calcification with no aneurysmal dilatation.    PELVIC ORGANS: Normal.    MUSCULOSKELETAL: Minute fatty umbilical hernia. Moderate degenerative changes lower lumbar spine particularly at the L5-S1 interspace.      Impression    IMPRESSION:  1.  Findings of right hemicolitis.    2.  Cirrhotic changes of the liver with secondary findings of portal venous hypertension. The liver is heterogenous and a nonemergent MRI examination of the liver may be of benefit to further characterize the parenchyma given the heterogenous appearance.    3.  Nonobstructive right nephrolithiasis.    4.  No aneurysm, dissection, or obvious PE seen.   CBC with platelets, differential     Status: Abnormal    Collection Time: 05/16/22 12:56 AM    Narrative    The following orders were created for panel order CBC with platelets, differential.  Procedure                               Abnormality         Status                     ---------                               -----------         ------                     CBC with platelets and d...[[id number]]  Abnormal            Final result                 Please view results for these tests on the individual orders.   CBC with platelets and differential     Status: Abnormal    Collection Time: 05/16/22 12:56 AM   Result Value Ref Range    WBC Count 11.5 (H) 4.0 - 11.0 10e3/uL    RBC Count 1.93 (L) 3.80 - 5.20 10e6/uL    Hemoglobin 7.7 (L) 11.7 - 15.7 g/dL    Hematocrit 20.9  (L) 35.0 - 47.0 %     (H) 78 - 100 fL    MCH 39.9 (H) 26.5 - 33.0 pg    MCHC 36.8 (H) 31.5 - 36.5 g/dL    RDW 13.9 10.0 - 15.0 %    Platelet Count 105 (L) 150 - 450 10e3/uL    % Neutrophils 83 %    % Lymphocytes 10 %    % Monocytes 7 %    % Eosinophils 0 %    % Basophils 0 %    % Immature Granulocytes 0 %    NRBCs per 100 WBC 0 <1 /100    Absolute Neutrophils 9.5 (H) 1.6 - 8.3 10e3/uL    Absolute Lymphocytes 1.2 0.8 - 5.3 10e3/uL    Absolute Monocytes 0.8 0.0 - 1.3 10e3/uL    Absolute Eosinophils 0.0 0.0 - 0.7 10e3/uL    Absolute Basophils 0.0 0.0 - 0.2 10e3/uL    Absolute Immature Granulocytes 0.0 <=0.4 10e3/uL    Absolute NRBCs 0.0 10e3/uL   CBC with platelets     Status: Abnormal    Collection Time: 05/16/22  6:05 AM   Result Value Ref Range    WBC Count 8.6 4.0 - 11.0 10e3/uL    RBC Count 1.66 (L) 3.80 - 5.20 10e6/uL    Hemoglobin 6.8 (LL) 11.7 - 15.7 g/dL    Hematocrit 18.4 (L) 35.0 - 47.0 %     (H) 78 - 100 fL    MCH 41.0 (H) 26.5 - 33.0 pg    MCHC 37.0 (H) 31.5 - 36.5 g/dL    RDW 14.2 10.0 - 15.0 %    Platelet Count 94 (L) 150 - 450 10e3/uL   Extra Tube     Status: None (In process)    Collection Time: 05/16/22  6:05 AM    Narrative    The following orders were created for panel order Extra Tube.  Procedure                               Abnormality         Status                     ---------                               -----------         ------                     Extra Blue Top Tube[[id number]]                              In process                 Extra Green Top (Lithium...[[id number]]                      In process                   Please view results for these tests on the individual orders.       ED MEDICATIONS:   Medications   sodium chloride 0.9% infusion ( Intravenous Rate/Dose Verify 5/16/22 5515)   flumazenil (ROMAZICON) injection 0.2 mg (has no administration in time range)   metoprolol (LOPRESSOR) injection 5 mg (has no administration in time range)   cloNIDine (CATAPRES)  tablet 0.1 mg (0.1 mg Oral Given 5/16/22 0307)   haloperidol lactate (HALDOL) injection 2.5-5 mg (has no administration in time range)   LORazepam (ATIVAN) tablet 1-2 mg (1 mg Oral Given 5/16/22 0307)     Or   LORazepam (ATIVAN) injection 1-2 mg ( Intravenous See Alternative 5/16/22 0307)   gabapentin (NEURONTIN) capsule 900 mg (has no administration in time range)   gabapentin (NEURONTIN) capsule 600 mg (has no administration in time range)   gabapentin (NEURONTIN) capsule 300 mg (has no administration in time range)   gabapentin (NEURONTIN) capsule 100 mg (has no administration in time range)   octreotide (sandoSTATIN) 1,250 mcg in D5W 250 mL (50 mcg/hr Intravenous Rate/Dose Verify 5/16/22 0615)   cefTRIAXone (ROCEPHIN) 1 g vial to attach to  mL bag for ADULTS or NS 50 mL bag for PEDS (0 g Intravenous Stopped 5/16/22 0448)   ondansetron (ZOFRAN) injection 4 mg (4 mg Intravenous Given 5/15/22 1724)   0.9% sodium chloride BOLUS (0 mLs Intravenous Stopped 5/15/22 1932)   sodium chloride 0.9 % 1,000 mL with Infuvite Adult 10 mL, thiamine 100 mg, folic acid 1 mg infusion ( Intravenous Stopped 5/15/22 2216)   ondansetron (ZOFRAN) injection 4 mg (4 mg Intravenous Given 5/16/22 0311)   0.9% sodium chloride BOLUS (0 mLs Intravenous Stopped 5/15/22 2347)   pantoprazole (PROTONIX) IV push injection 40 mg (40 mg Intravenous Given 5/15/22 2221)   iopamidol (ISOVUE-370) solution 74 mL (74 mLs Intravenous Given 5/15/22 2234)   sodium chloride 0.9 % bag 500mL for CT scan flush use (58 mLs Intravenous Given 5/15/22 2233)   LORazepam (ATIVAN) injection 0.5 mg (0.5 mg Intravenous Given 5/15/22 2347)   0.9% sodium chloride BOLUS (0 mLs Intravenous Stopped 5/16/22 0032)   LORazepam (ATIVAN) injection 0.5 mg (0.5 mg Intravenous Given 5/16/22 0047)   gabapentin (NEURONTIN) tablet 1,200 mg (1,200 mg Oral Given 5/16/22 0306)   octreotide (sandoSTATIN) injection 50 mcg (50 mcg Intravenous Given 5/16/22 4661)   PHENobarbital (LUMINAL)  700 mg in sodium chloride 0.9 % 100 mL intermittent infusion (700 mg Intravenous Given 5/16/22 2131)         Impression:    ICD-10-CM    1. Hematemesis with nausea  K92.0        Plan:    Pending studies include  Patient is reviewed with Dr. Sanders general surgery, declines admission here given possibility of esophageal variceal bleeding.  Continue awaiting bed at Center with GI available.  Ultimately I was able to speak with Dr. Ulices Dunbar triage physician at [facility], he accepts patient in transfer for further evaluation and treatment.    MD Kun Florian Scott L, MD  05/16/22 0780

## 2022-05-16 NOTE — PROGRESS NOTES
[facility]  Transfer Triage Note    Date of call: 05/16/22  Time of call: 11:50 AM    Current Patient Location: [facility]  Current Level of Care: ED    Vitals: Temp: 98.8  F (37.1  C) Temp src: Oral BP: 105/69 Pulse: 109   Resp: 18 SpO2: 95 % Height: 182.9 cm (6') Weight: 68 kg (150 lb)  O2 Device: None (Room air) at    Diagnosis: GI Bleeding  Is COVID-19 a concern? No  Reason for requested transfer: Procedure can be done here and not at referring hospital   Isolation Needs: Enteric; D diff currently pending    Outside Records: Available  Additional records may be faxed to [phone].    Transfer accepted: Yes  Stability of Patient: Patient is at risk for instability, however patient requires urgent transfer and does not meet ICU criteria   Level of Care Needed: IMC  Telemetry Needed:  Med (Remote) Telemetry  Expected Time of Arrival for Transfer: 0-8 hours  Arrival Location:  [facility]    Recommendations for Management and Stabilization: Not needed    Additional Comments: This is a 46 year old female with alcohol use disorder presented with hematemesis.  Hgb continues to downtrend despite 2u pRBCs, most recent hgb 6.8.  No hx varices, cirrhotic changes and portal HTN noted on CT.  GIB treated with pantop and octreotide, also received 1 dose CTX.  Persistently tachycardic 110s, systolic BPs 100-110s.  ED discussed with Gen Surgery at [facility], unable to perform endoscopy.  I discussed with Dr Stone of Gastroenterology on [facility] who agreed to endoscopy if patient is able to transfer, currently there is not a bed available.       Stephen Harvey MD

## 2022-05-16 NOTE — ED PROVIDER NOTES
Emergency Department Patient Sign-out       Brief HPI:  This is a 46 year old female signed out to me by Dr. Phelan .  See initial ED Provider note for details of the presentation.            Significant Events prior to my assuming care: 46-year-old female with heavy alcohol use who presents for hematemesis.  Initial given IV fluids with drop in her hemoglobin to 7.3, likely some combination of GI bleed and hemodilution effect cause of her symptoms.  She is given 1 unit of packed red blood cells and the plan is to find an appropriate hospital bed for her.  Unfortunately the surgeons at both [facility] and [facility] did not feel comfortable caring for this patient due to the fact that she has some cirrhosis on the CT scan and there is some concern for possible varices causing her bleeding.      Exam:   Patient Vitals for the past 24 hrs:   BP Temp Temp src Pulse Resp SpO2 Height Weight   05/16/22 0330 122/75 -- -- (!) 124 16 -- -- --   05/16/22 0300 124/74 -- -- (!) 130 11 -- -- --   05/16/22 0200 106/71 -- -- (!) 124 17 94 % -- --   05/16/22 0100 99/64 -- -- (!) 123 15 95 % -- --   05/16/22 0045 106/71 -- -- (!) 125 16 92 % -- --   05/16/22 0030 118/72 -- -- (!) 125 17 95 % -- --   05/16/22 0015 127/79 -- -- (!) 124 (!) 33 96 % -- --   05/16/22 0000 -- -- -- (!) 140 10 98 % -- --   05/15/22 2330 133/76 -- -- (!) 129 13 98 % -- --   05/15/22 2300 133/79 -- -- (!) 121 12 97 % -- --   05/15/22 2200 -- -- -- (!) 125 -- 95 % -- --   05/15/22 2145 132/64 -- -- (!) 129 23 96 % -- --   05/15/22 2130 132/81 -- -- (!) 130 23 95 % -- --   05/15/22 2115 134/81 -- -- (!) 144 24 95 % -- --   05/15/22 2100 122/80 -- -- 118 11 97 % -- --   05/15/22 2030 123/66 -- -- 116 11 94 % -- --   05/15/22 2000 124/61 -- -- 112 16 95 % -- --   05/15/22 1945 132/83 -- -- 112 11 98 % -- --   05/15/22 1845 137/80 -- -- 116 18 94 % -- --   05/15/22 1830 130/81 -- -- 116 13 95 % -- --   05/15/22 1815 138/86 -- -- 105 16 95 % -- --    05/15/22 1800 131/83 -- -- 112 17 94 % -- --   05/15/22 1730 128/83 -- -- 117 14 95 % -- --   05/15/22 1714 (!) 140/92 98.8  F (37.1  C) Oral (!) 124 -- 97 % 1.829 m (6') 68 kg (150 lb)           ED RESULTS:   Results for orders placed or performed during the hospital encounter of 05/15/22 (from the past 24 hour(s))   [place] Draw     Status: None    Collection Time: 05/15/22  5:23 PM    Narrative    The following orders were created for panel order [place] Draw.  Procedure                               Abnormality         Status                     ---------                               -----------         ------                     Extra Blue Top Tube[[id number]]                              Final result               Extra Red Top Tube[[id number]]                               Final result               Extra Green Top (Lithium...[[id number]]                      Final result               Extra Purple Top Tube[[id number]]                            Final result                 Please view results for these tests on the individual orders.   Extra Blue Top Tube     Status: None    Collection Time: 05/15/22  5:23 PM   Result Value Ref Range    Hold Specimen JIC    Extra Red Top Tube     Status: None    Collection Time: 05/15/22  5:23 PM   Result Value Ref Range    Hold Specimen JIC    Extra Green Top (Lithium Heparin) Tube     Status: None    Collection Time: 05/15/22  5:23 PM   Result Value Ref Range    Hold Specimen JIC    Extra Purple Top Tube     Status: None    Collection Time: 05/15/22  5:23 PM   Result Value Ref Range    Hold Specimen JIC    CBC with platelets differential *Canceled*     Status: None ()    Collection Time: 05/15/22  5:23 PM    Narrative    The following orders were created for panel order CBC with platelets differential.  Procedure                               Abnormality         Status                     ---------                               -----------         ------                      CBC with platelets and d...[[id number]]                                                   Please view results for these tests on the individual orders.   Comprehensive metabolic panel     Status: Abnormal    Collection Time: 05/15/22  5:23 PM   Result Value Ref Range    Sodium 140 133 - 144 mmol/L    Potassium 3.5 3.4 - 5.3 mmol/L    Chloride 106 94 - 109 mmol/L    Carbon Dioxide (CO2) 22 20 - 32 mmol/L    Anion Gap 12 3 - 14 mmol/L    Urea Nitrogen 16 7 - 30 mg/dL    Creatinine 0.60 0.52 - 1.04 mg/dL    Calcium 8.9 8.5 - 10.1 mg/dL    Glucose 123 (H) 70 - 99 mg/dL    Alkaline Phosphatase 198 (H) 40 - 150 U/L     (H) 0 - 45 U/L    ALT 36 0 - 50 U/L    Protein Total 8.3 6.8 - 8.8 g/dL    Albumin 3.3 (L) 3.4 - 5.0 g/dL    Bilirubin Total 3.2 (H) 0.2 - 1.3 mg/dL    GFR Estimate >90 >60 mL/min/1.73m2   Lipase     Status: Normal    Collection Time: 05/15/22  5:23 PM   Result Value Ref Range    Lipase 110 73 - 393 U/L   Ethyl Alcohol Level     Status: Abnormal    Collection Time: 05/15/22  5:23 PM   Result Value Ref Range    Alcohol ethyl 0.09 (H) <=0.01 g/dL   INR     Status: Abnormal    Collection Time: 05/15/22  5:23 PM   Result Value Ref Range    INR 1.48 (H) 0.85 - 1.15   Partial thromboplastin time     Status: Normal    Collection Time: 05/15/22  5:23 PM   Result Value Ref Range    aPTT 36 22 - 38 Seconds   CBC with Platelets & Differential     Status: Abnormal    Collection Time: 05/15/22  5:23 PM    Narrative    The following orders were created for panel order CBC with Platelets & Differential.  Procedure                               Abnormality         Status                     ---------                               -----------         ------                     CBC with platelets and d...[[id number]]  Abnormal            Final result               RBC and Platelet Morphology[[id number]]                      Final result                 Please view results for these tests on the individual orders.   CBC  with platelets and differential     Status: Abnormal    Collection Time: 05/15/22  5:23 PM   Result Value Ref Range    WBC Count 13.4 (H) 4.0 - 11.0 10e3/uL    RBC Count 2.99 (L) 3.80 - 5.20 10e6/uL    Hemoglobin 10.5 (L) 11.7 - 15.7 g/dL    Hematocrit 30.9 (L) 35.0 - 47.0 %     (H) 78 - 100 fL    MCH 35.1 (H) 26.5 - 33.0 pg    MCHC 34.0 31.5 - 36.5 g/dL    RDW 13.6 10.0 - 15.0 %    Platelet Count 140 (L) 150 - 450 10e3/uL    % Neutrophils 86 %    % Lymphocytes 8 %    % Monocytes 5 %    % Eosinophils 0 %    % Basophils 1 %    % Immature Granulocytes 1 %    NRBCs per 100 WBC 0 <1 /100    Absolute Neutrophils 11.3 (H) 1.6 - 8.3 10e3/uL    Absolute Lymphocytes 1.2 0.8 - 5.3 10e3/uL    Absolute Monocytes 0.6 0.0 - 1.3 10e3/uL    Mids Abs (Monos, Eos, Basos) 0.7 0.0 - 2.2 10e3/uL    Absolute Eosinophils 0.0 0.0 - 0.7 10e3/uL    Absolute Basophils 0.1 0.0 - 0.2 10e3/uL    Absolute Immature Granulocytes 0.1 <=0.4 10e3/uL    Absolute NRBCs 0.0 10e3/uL   RBC and Platelet Morphology     Status: None    Collection Time: 05/15/22  5:23 PM   Result Value Ref Range    Platelet Assessment  Automated Count Confirmed. Platelet morphology is normal.     Automated Count Confirmed. Platelet morphology is normal.    RBC Morphology Confirmed RBC Indices    ABO/Rh type and screen *Canceled*     Status: None ()    Collection Time: 05/15/22  5:23 PM    Narrative    The following orders were created for panel order ABO/Rh type and screen.  Procedure                               Abnormality         Status                     ---------                               -----------         ------                     Adult Type and Screen[[id number]]                                                         Please view results for these tests on the individual orders.   Magnesium     Status: Abnormal    Collection Time: 05/15/22  5:23 PM   Result Value Ref Range    Magnesium 1.2 (L) 1.6 - 2.3 mg/dL   Phosphorus     Status: Normal    Collection  Time: 05/15/22  5:23 PM   Result Value Ref Range    Phosphorus 3.5 2.5 - 4.5 mg/dL   CBC with platelets differential *Canceled*     Status: None ()    Collection Time: 05/15/22  6:28 PM    Narrative    The following orders were created for panel order CBC with platelets differential.  Procedure                               Abnormality         Status                     ---------                               -----------         ------                       Please view results for these tests on the individual orders.   Occult blood stool     Status: Abnormal    Collection Time: 05/15/22  9:18 PM   Result Value Ref Range    Occult Blood Positive (A) Negative   ABO/Rh type and screen *Canceled*     Status: None ()    Collection Time: 05/15/22  9:22 PM    Narrative    The following orders were created for panel order ABO/Rh type and screen.  Procedure                               Abnormality         Status                     ---------                               -----------         ------                       Please view results for these tests on the individual orders.   Occult blood gastric     Status: Abnormal    Collection Time: 05/15/22  9:25 PM   Result Value Ref Range    Occult Blood Gastric Positive (A) Negative    pH Gastric 3 pH    ABO/Rh type and screen     Status: None    Collection Time: 05/15/22  9:29 PM    Narrative    The following orders were created for panel order ABO/Rh type and screen.  Procedure                               Abnormality         Status                     ---------                               -----------         ------                     Adult Type and Screen[[id number]]                            Final result                 Please view results for these tests on the individual orders.   Adult Type and Screen     Status: None    Collection Time: 05/15/22  9:29 PM   Result Value Ref Range    ABO/RH(D) O POS     SPECIMEN EXPIRATION DATE [id number]    Antibody Screen -  Red Cell     Status: Abnormal    Collection Time: 05/15/22  9:29 PM   Result Value Ref Range    Antibody Screen Positive (A) Negative    SPECIMEN EXPIRATION DATE [id number]    CT Chest/Abdomen/Pelvis w Contrast     Status: None    Collection Time: 05/15/22 10:50 PM    Narrative    EXAM: CT CHEST/ABDOMEN/PELVIS W CONTRAST  LOCATION: [facility]  DATE/TIME: 5/15/2022 10:30 PM    INDICATION: Chest pain or back pain, aortic dissection suspected.  COMPARISON: 07/19/2017.  TECHNIQUE: CT scan of the chest, abdomen, and pelvis was performed following injection of IV contrast. Multiplanar reformats were obtained. Dose reduction techniques were used.   CONTRAST: 74 mL Isovue 370.    FINDINGS:   LUNGS AND PLEURA: Normal.    MEDIASTINUM/AXILLAE: Normal.    CORONARY ARTERY CALCIFICATION: Minimal.    HEPATOBILIARY: There is heterogenous density seen scattered in both lobes of the liver which is nonspecific, however on previous CT 07/19/2017 there is advanced fatty infiltration of liver, thus today's abnormal appearance of the liver parenchyma could   be secondary to some more localized areas of fatty infiltration or represent sequelae from findings worrisome for cirrhosis. There are new findings of increased portal venous pressures seen on today's CT with splenomegaly (14.7 cm) and some scattered   varicosities. Portal vein is patent and normal in caliber. The gallbladder and bile ducts are normal.    PANCREAS: Normal.    SPLEEN: Enlarged measuring 14.7 cm [id number] with some scattered varicosities consistent with portal venous hypertension.    ADRENAL GLANDS: Normal.    KIDNEYS/BLADDER: There is a 3 mm x 2 mm nonobstructive stone seen in the midpole of the right kidney. The kidneys, ureters, and bladder are otherwise normal.    BOWEL: The appendix is surgically absent. There is mild to moderate thickening seen involving the cecum through the mid transverse colon consistent with colitis which is  of unknown etiology, however given location this can be associated with C. difficile   toxicity.    LYMPH NODES: Normal.    VASCULATURE: Minimal calcification with no aneurysmal dilatation.    PELVIC ORGANS: Normal.    MUSCULOSKELETAL: Minute fatty umbilical hernia. Moderate degenerative changes lower lumbar spine particularly at the L5-S1 interspace.      Impression    IMPRESSION:  1.  Findings of right hemicolitis.    2.  Cirrhotic changes of the liver with secondary findings of portal venous hypertension. The liver is heterogenous and a nonemergent MRI examination of the liver may be of benefit to further characterize the parenchyma given the heterogenous appearance.    3.  Nonobstructive right nephrolithiasis.    4.  No aneurysm, dissection, or obvious PE seen.   CBC with platelets, differential     Status: Abnormal    Collection Time: 05/16/22 12:56 AM    Narrative    The following orders were created for panel order CBC with platelets, differential.  Procedure                               Abnormality         Status                     ---------                               -----------         ------                     CBC with platelets and d...[[id number]]  Abnormal            Final result                 Please view results for these tests on the individual orders.   CBC with platelets and differential     Status: Abnormal    Collection Time: 05/16/22 12:56 AM   Result Value Ref Range    WBC Count 11.5 (H) 4.0 - 11.0 10e3/uL    RBC Count 1.93 (L) 3.80 - 5.20 10e6/uL    Hemoglobin 7.7 (L) 11.7 - 15.7 g/dL    Hematocrit 20.9 (L) 35.0 - 47.0 %     (H) 78 - 100 fL    MCH 39.9 (H) 26.5 - 33.0 pg    MCHC 36.8 (H) 31.5 - 36.5 g/dL    RDW 13.9 10.0 - 15.0 %    Platelet Count 105 (L) 150 - 450 10e3/uL    % Neutrophils 83 %    % Lymphocytes 10 %    % Monocytes 7 %    % Eosinophils 0 %    % Basophils 0 %    % Immature Granulocytes 0 %    NRBCs per 100 WBC 0 <1 /100    Absolute Neutrophils 9.5 (H) 1.6 - 8.3  10e3/uL    Absolute Lymphocytes 1.2 0.8 - 5.3 10e3/uL    Absolute Monocytes 0.8 0.0 - 1.3 10e3/uL    Absolute Eosinophils 0.0 0.0 - 0.7 10e3/uL    Absolute Basophils 0.0 0.0 - 0.2 10e3/uL    Absolute Immature Granulocytes 0.0 <=0.4 10e3/uL    Absolute NRBCs 0.0 10e3/uL       ED MEDICATIONS:   Medications   sodium chloride 0.9% infusion ( Intravenous New Bag 5/16/22 0357)   flumazenil (ROMAZICON) injection 0.2 mg (has no administration in time range)   metoprolol (LOPRESSOR) injection 5 mg (has no administration in time range)   cloNIDine (CATAPRES) tablet 0.1 mg (0.1 mg Oral Given 5/16/22 0307)   haloperidol lactate (HALDOL) injection 2.5-5 mg (has no administration in time range)   LORazepam (ATIVAN) tablet 1-2 mg (1 mg Oral Given 5/16/22 0307)     Or   LORazepam (ATIVAN) injection 1-2 mg ( Intravenous See Alternative 5/16/22 0307)   gabapentin (NEURONTIN) capsule 900 mg (has no administration in time range)   gabapentin (NEURONTIN) capsule 600 mg (has no administration in time range)   gabapentin (NEURONTIN) capsule 300 mg (has no administration in time range)   gabapentin (NEURONTIN) capsule 100 mg (has no administration in time range)   octreotide (sandoSTATIN) 1,250 mcg in D5W 250 mL (50 mcg/hr Intravenous New Bag 5/16/22 0405)   cefTRIAXone (ROCEPHIN) 1 g vial to attach to  mL bag for ADULTS or NS 50 mL bag for PEDS (1 g Intravenous New Bag 5/16/22 0401)   ondansetron (ZOFRAN) injection 4 mg (4 mg Intravenous Given 5/15/22 1724)   0.9% sodium chloride BOLUS (0 mLs Intravenous Stopped 5/15/22 1932)   sodium chloride 0.9 % 1,000 mL with Infuvite Adult 10 mL, thiamine 100 mg, folic acid 1 mg infusion ( Intravenous Stopped 5/15/22 2216)   ondansetron (ZOFRAN) injection 4 mg (4 mg Intravenous Given 5/16/22 0311)   0.9% sodium chloride BOLUS (0 mLs Intravenous Stopped 5/15/22 1103)   pantoprazole (PROTONIX) IV push injection 40 mg (40 mg Intravenous Given 5/15/22 2221)   iopamidol (ISOVUE-370) solution 74 mL  (74 mLs Intravenous Given 5/15/22 2234)   sodium chloride 0.9 % bag 500mL for CT scan flush use (58 mLs Intravenous Given 5/15/22 2233)   LORazepam (ATIVAN) injection 0.5 mg (0.5 mg Intravenous Given 5/15/22 2347)   0.9% sodium chloride BOLUS (0 mLs Intravenous Stopped 5/16/22 0032)   LORazepam (ATIVAN) injection 0.5 mg (0.5 mg Intravenous Given 5/16/22 0047)   gabapentin (NEURONTIN) tablet 1,200 mg (1,200 mg Oral Given 5/16/22 0306)   octreotide (sandoSTATIN) injection 50 mcg (50 mcg Intravenous Given 5/16/22 0353)   PHENobarbital (LUMINAL) 700 mg in sodium chloride 0.9 % 100 mL intermittent infusion (700 mg Intravenous Given 5/16/22 0450)         Impression:    ICD-10-CM    1. Hematemesis with nausea  K92.0        Plan:    Continue with symptomatic treatment and try to find an appropriate placement for the patient.  Given the cirrhosis I have started her on octreotide as well as given her dose of ceftriaxone.  She has had persistent tachycardia and there is concern of alcohol withdrawal as a cause of her symptoms.  She is given IV phenobarbital with improvement.  She is resting comfortably now.    In the morning her hemoglobin is down to 6.8.  We are still waiting on blood to give unfortunately as it has to be specially sent due to antibodies in her blood.  I discussed the case with the surgeon in the morning to revisit whether this would be an appropriate patient to stay at this hospital and he is declined stating that given her history of cirrhosis's dizziness is concerning for possible varices and he would not feel comfortable performing endoscopy on her at this time.  The patient is signed out to Dr. Tristan at change of shift looking for an appropriate bed for transfer of this patient.      MD Mona Coker, Yoni Saenz MD  05/16/22 1303

## 2022-05-17 NOTE — RESULT ENCOUNTER NOTE
Final Enteric Bacteria and Virus Panel by [facility]DR[facility] Stool is NEGATIVE for all tested organisms (bacteria/virus).  No treatment or change in treatment per [facility] Lab Result Enteric Bacteria and Virus Panel protocol.

## 2022-05-24 LAB
Lab: NORMAL
PERFORMING LABORATORY: NORMAL
SPECIMEN STATUS: NORMAL
TEST NAME: NORMAL

## 2024-05-08 NOTE — LETTER
January 16, 2019      Geovanna Olvera  [address] [facility] [address]            The results of your recent throat culture were negative.  If you have any further questions or concerns please contact the clinic.            Sincerely,        MARCOS Zepeda CNP/ls           no